# Patient Record
Sex: MALE | Race: WHITE | NOT HISPANIC OR LATINO
[De-identification: names, ages, dates, MRNs, and addresses within clinical notes are randomized per-mention and may not be internally consistent; named-entity substitution may affect disease eponyms.]

---

## 2017-05-23 PROBLEM — Z00.00 ENCOUNTER FOR PREVENTIVE HEALTH EXAMINATION: Status: ACTIVE | Noted: 2017-05-23

## 2017-06-07 ENCOUNTER — FORM ENCOUNTER (OUTPATIENT)
Age: 62
End: 2017-06-07

## 2017-06-08 ENCOUNTER — APPOINTMENT (OUTPATIENT)
Dept: ORTHOPEDIC SURGERY | Facility: CLINIC | Age: 62
End: 2017-06-08

## 2017-06-08 ENCOUNTER — OUTPATIENT (OUTPATIENT)
Dept: OUTPATIENT SERVICES | Facility: HOSPITAL | Age: 62
LOS: 1 days | End: 2017-06-08
Payer: OTHER GOVERNMENT

## 2017-06-08 VITALS — HEIGHT: 70 IN | WEIGHT: 175 LBS | BODY MASS INDEX: 25.05 KG/M2

## 2017-06-08 DIAGNOSIS — M17.11 UNILATERAL PRIMARY OSTEOARTHRITIS, RIGHT KNEE: ICD-10-CM

## 2017-06-08 PROCEDURE — 72170 X-RAY EXAM OF PELVIS: CPT

## 2017-06-08 PROCEDURE — 72170 X-RAY EXAM OF PELVIS: CPT | Mod: 26

## 2017-06-08 PROCEDURE — 73564 X-RAY EXAM KNEE 4 OR MORE: CPT | Mod: 26,RT

## 2017-06-08 PROCEDURE — 73564 X-RAY EXAM KNEE 4 OR MORE: CPT

## 2017-07-10 ENCOUNTER — OTHER (OUTPATIENT)
Age: 62
End: 2017-07-10

## 2017-07-11 ENCOUNTER — OTHER (OUTPATIENT)
Age: 62
End: 2017-07-11

## 2017-07-18 ENCOUNTER — FORM ENCOUNTER (OUTPATIENT)
Age: 62
End: 2017-07-18

## 2017-07-19 ENCOUNTER — OUTPATIENT (OUTPATIENT)
Dept: OUTPATIENT SERVICES | Facility: HOSPITAL | Age: 62
LOS: 1 days | End: 2017-07-19
Payer: OTHER GOVERNMENT

## 2017-07-19 DIAGNOSIS — Z22.321 CARRIER OR SUSPECTED CARRIER OF METHICILLIN SUSCEPTIBLE STAPHYLOCOCCUS AUREUS: ICD-10-CM

## 2017-07-19 DIAGNOSIS — M17.11 UNILATERAL PRIMARY OSTEOARTHRITIS, RIGHT KNEE: ICD-10-CM

## 2017-07-19 LAB
ANION GAP SERPL CALC-SCNC: 18 MMOL/L — HIGH (ref 5–17)
APPEARANCE UR: CLEAR — SIGNIFICANT CHANGE UP
APTT BLD: 27.2 SEC — LOW (ref 27.5–37.4)
BACTERIA # UR AUTO: PRESENT /HPF
BILIRUB UR-MCNC: NEGATIVE — SIGNIFICANT CHANGE UP
BUN SERPL-MCNC: 11 MG/DL — SIGNIFICANT CHANGE UP (ref 7–23)
CALCIUM SERPL-MCNC: 9.2 MG/DL — SIGNIFICANT CHANGE UP (ref 8.4–10.5)
CHLORIDE SERPL-SCNC: 94 MMOL/L — LOW (ref 96–108)
CO2 SERPL-SCNC: 23 MMOL/L — SIGNIFICANT CHANGE UP (ref 22–31)
COLOR SPEC: YELLOW — SIGNIFICANT CHANGE UP
CREAT SERPL-MCNC: 0.7 MG/DL — SIGNIFICANT CHANGE UP (ref 0.5–1.3)
DIFF PNL FLD: (no result)
EPI CELLS # UR: SIGNIFICANT CHANGE UP /HPF
GLUCOSE SERPL-MCNC: 82 MG/DL — SIGNIFICANT CHANGE UP (ref 70–99)
GLUCOSE UR QL: NEGATIVE — SIGNIFICANT CHANGE UP
HBA1C BLD-MCNC: 5 % — SIGNIFICANT CHANGE UP (ref 4–5.6)
HCT VFR BLD CALC: 43.4 % — SIGNIFICANT CHANGE UP (ref 39–50)
HGB BLD-MCNC: 14.7 G/DL — SIGNIFICANT CHANGE UP (ref 13–17)
INR BLD: 1.01 — SIGNIFICANT CHANGE UP (ref 0.88–1.16)
KETONES UR-MCNC: NEGATIVE — SIGNIFICANT CHANGE UP
LEUKOCYTE ESTERASE UR-ACNC: NEGATIVE — SIGNIFICANT CHANGE UP
MCHC RBC-ENTMCNC: 32.5 PG — SIGNIFICANT CHANGE UP (ref 27–34)
MCHC RBC-ENTMCNC: 33.9 G/DL — SIGNIFICANT CHANGE UP (ref 32–36)
MCV RBC AUTO: 96 FL — SIGNIFICANT CHANGE UP (ref 80–100)
NITRITE UR-MCNC: NEGATIVE — SIGNIFICANT CHANGE UP
PH UR: 5.5 — SIGNIFICANT CHANGE UP (ref 5–8)
PLATELET # BLD AUTO: 131 K/UL — LOW (ref 150–400)
POTASSIUM SERPL-MCNC: 3.5 MMOL/L — SIGNIFICANT CHANGE UP (ref 3.5–5.3)
POTASSIUM SERPL-SCNC: 3.5 MMOL/L — SIGNIFICANT CHANGE UP (ref 3.5–5.3)
PROT UR-MCNC: NEGATIVE MG/DL — SIGNIFICANT CHANGE UP
PROTHROM AB SERPL-ACNC: 11.2 SEC — SIGNIFICANT CHANGE UP (ref 9.8–12.7)
RBC # BLD: 4.52 M/UL — SIGNIFICANT CHANGE UP (ref 4.2–5.8)
RBC # FLD: 12.5 % — SIGNIFICANT CHANGE UP (ref 10.3–16.9)
RBC CASTS # UR COMP ASSIST: < 5 /HPF — SIGNIFICANT CHANGE UP
SODIUM SERPL-SCNC: 135 MMOL/L — SIGNIFICANT CHANGE UP (ref 135–145)
SP GR SPEC: 1.02 — SIGNIFICANT CHANGE UP (ref 1–1.03)
UROBILINOGEN FLD QL: 0.2 E.U./DL — SIGNIFICANT CHANGE UP
WBC # BLD: 5.2 K/UL — SIGNIFICANT CHANGE UP (ref 3.8–10.5)
WBC # FLD AUTO: 5.2 K/UL — SIGNIFICANT CHANGE UP (ref 3.8–10.5)
WBC UR QL: < 5 /HPF — SIGNIFICANT CHANGE UP

## 2017-07-19 PROCEDURE — 71020: CPT | Mod: 26

## 2017-07-19 PROCEDURE — 85027 COMPLETE CBC AUTOMATED: CPT

## 2017-07-19 PROCEDURE — 85610 PROTHROMBIN TIME: CPT

## 2017-07-19 PROCEDURE — 71046 X-RAY EXAM CHEST 2 VIEWS: CPT

## 2017-07-19 PROCEDURE — 93010 ELECTROCARDIOGRAM REPORT: CPT

## 2017-07-19 PROCEDURE — 87086 URINE CULTURE/COLONY COUNT: CPT

## 2017-07-19 PROCEDURE — 93005 ELECTROCARDIOGRAM TRACING: CPT

## 2017-07-19 PROCEDURE — 80048 BASIC METABOLIC PNL TOTAL CA: CPT

## 2017-07-19 PROCEDURE — 81001 URINALYSIS AUTO W/SCOPE: CPT

## 2017-07-19 PROCEDURE — 87641 MR-STAPH DNA AMP PROBE: CPT

## 2017-07-19 PROCEDURE — 83036 HEMOGLOBIN GLYCOSYLATED A1C: CPT

## 2017-07-19 PROCEDURE — 85730 THROMBOPLASTIN TIME PARTIAL: CPT

## 2017-07-21 LAB
CULTURE RESULTS: NO GROWTH — SIGNIFICANT CHANGE UP
SPECIMEN SOURCE: SIGNIFICANT CHANGE UP

## 2017-07-23 LAB
MRSA PCR RESULT.: NEGATIVE — SIGNIFICANT CHANGE UP
S AUREUS DNA NOSE QL NAA+PROBE: NEGATIVE — SIGNIFICANT CHANGE UP

## 2017-07-27 ENCOUNTER — APPOINTMENT (OUTPATIENT)
Dept: INTERNAL MEDICINE | Facility: CLINIC | Age: 62
End: 2017-07-27
Payer: OTHER GOVERNMENT

## 2017-07-27 VITALS
DIASTOLIC BLOOD PRESSURE: 70 MMHG | TEMPERATURE: 98.5 F | HEART RATE: 96 BPM | BODY MASS INDEX: 25.05 KG/M2 | WEIGHT: 175 LBS | SYSTOLIC BLOOD PRESSURE: 130 MMHG | OXYGEN SATURATION: 98 % | HEIGHT: 70 IN

## 2017-07-27 DIAGNOSIS — D69.6 THROMBOCYTOPENIA, UNSPECIFIED: ICD-10-CM

## 2017-07-27 PROCEDURE — 99204 OFFICE O/P NEW MOD 45 MIN: CPT

## 2017-07-31 ENCOUNTER — MEDICATION RENEWAL (OUTPATIENT)
Age: 62
End: 2017-07-31

## 2017-08-04 VITALS
SYSTOLIC BLOOD PRESSURE: 144 MMHG | DIASTOLIC BLOOD PRESSURE: 102 MMHG | RESPIRATION RATE: 20 BRPM | WEIGHT: 182.98 LBS | HEART RATE: 114 BPM | HEIGHT: 69 IN

## 2017-08-04 RX ORDER — OXYCODONE HYDROCHLORIDE 5 MG/1
20 TABLET ORAL ONCE
Qty: 0 | Refills: 0 | Status: DISCONTINUED | OUTPATIENT
Start: 2017-08-07 | End: 2017-08-07

## 2017-08-04 RX ORDER — CELECOXIB 200 MG/1
200 CAPSULE ORAL ONCE
Qty: 0 | Refills: 0 | Status: COMPLETED | OUTPATIENT
Start: 2017-08-07 | End: 2017-08-07

## 2017-08-04 NOTE — H&P ADULT - PROBLEM SELECTOR PLAN 1
Admit to Orthopaedic Service.  Presents today for elective right total hip replacement, anterior approach  Pt medically stable and cleared for procedure today by Dr. Rosales Zavala MD

## 2017-08-04 NOTE — H&P ADULT - NSHPPHYSICALEXAM_GEN_ALL_CORE
MSK:  +decreased ROM secondary to pain, right hip    Remainder of exam per medical clearance note MSK:  +decreased ROM secondary to pain, right hip  ehl, tib ant, GS 5/5 b/l  gross sensation intact b/ LE  DP palpable b/l  thigh/calf soft, NT    Remainder of exam per medical clearance note

## 2017-08-04 NOTE — H&P ADULT - NSHPLABSRESULTS_GEN_ALL_CORE
Preop CBC, BMP, PT/PTT/INR, UA/UCX - WNL per medical clearance   Preop EKG - normal sinus rhythm - reviewed by medical clearance   Preop CXR - no focal infiltrates - reviewed by medical clearance

## 2017-08-04 NOTE — H&P ADULT - HISTORY OF PRESENT ILLNESS
62M c/o right hip pain x    Presents today for elective right total hip replacement, anterior approach 62M c/o right hip pain x chronic    Presents today for elective right total hip replacement, anterior approach.  Independent ambulator. No numbness of LE. No complaints.

## 2017-08-07 ENCOUNTER — INPATIENT (INPATIENT)
Facility: HOSPITAL | Age: 62
LOS: 0 days | Discharge: HOME | DRG: 470 | End: 2017-08-08
Attending: ORTHOPAEDIC SURGERY | Admitting: ORTHOPAEDIC SURGERY
Payer: OTHER GOVERNMENT

## 2017-08-07 ENCOUNTER — APPOINTMENT (OUTPATIENT)
Dept: ORTHOPEDIC SURGERY | Facility: HOSPITAL | Age: 62
End: 2017-08-07

## 2017-08-07 ENCOUNTER — RESULT REVIEW (OUTPATIENT)
Age: 62
End: 2017-08-07

## 2017-08-07 DIAGNOSIS — M16.11 UNILATERAL PRIMARY OSTEOARTHRITIS, RIGHT HIP: ICD-10-CM

## 2017-08-07 PROCEDURE — 27130 TOTAL HIP ARTHROPLASTY: CPT | Mod: RT

## 2017-08-07 PROCEDURE — 72170 X-RAY EXAM OF PELVIS: CPT | Mod: 26

## 2017-08-07 RX ORDER — MORPHINE SULFATE 50 MG/1
2 CAPSULE, EXTENDED RELEASE ORAL
Qty: 0 | Refills: 0 | Status: DISCONTINUED | OUTPATIENT
Start: 2017-08-07 | End: 2017-08-08

## 2017-08-07 RX ORDER — MAGNESIUM HYDROXIDE 400 MG/1
30 TABLET, CHEWABLE ORAL DAILY
Qty: 0 | Refills: 0 | Status: DISCONTINUED | OUTPATIENT
Start: 2017-08-07 | End: 2017-08-08

## 2017-08-07 RX ORDER — ACETAMINOPHEN 500 MG
650 TABLET ORAL EVERY 6 HOURS
Qty: 0 | Refills: 0 | Status: DISCONTINUED | OUTPATIENT
Start: 2017-08-07 | End: 2017-08-08

## 2017-08-07 RX ORDER — ASPIRIN/CALCIUM CARB/MAGNESIUM 324 MG
325 TABLET ORAL
Qty: 0 | Refills: 0 | Status: DISCONTINUED | OUTPATIENT
Start: 2017-08-07 | End: 2017-08-08

## 2017-08-07 RX ORDER — LABETALOL HCL 100 MG
200 TABLET ORAL ONCE
Qty: 0 | Refills: 0 | Status: COMPLETED | OUTPATIENT
Start: 2017-08-07 | End: 2017-08-07

## 2017-08-07 RX ORDER — BUPIVACAINE 13.3 MG/ML
20 INJECTION, SUSPENSION, LIPOSOMAL INFILTRATION ONCE
Qty: 0 | Refills: 0 | Status: DISCONTINUED | OUTPATIENT
Start: 2017-08-07 | End: 2017-08-08

## 2017-08-07 RX ORDER — ONDANSETRON 8 MG/1
4 TABLET, FILM COATED ORAL EVERY 6 HOURS
Qty: 0 | Refills: 0 | Status: DISCONTINUED | OUTPATIENT
Start: 2017-08-07 | End: 2017-08-08

## 2017-08-07 RX ORDER — CEFAZOLIN SODIUM 1 G
2000 VIAL (EA) INJECTION EVERY 8 HOURS
Qty: 0 | Refills: 0 | Status: COMPLETED | OUTPATIENT
Start: 2017-08-07 | End: 2017-08-08

## 2017-08-07 RX ORDER — CELECOXIB 200 MG/1
200 CAPSULE ORAL
Qty: 0 | Refills: 0 | Status: DISCONTINUED | OUTPATIENT
Start: 2017-08-07 | End: 2017-08-08

## 2017-08-07 RX ORDER — POLYETHYLENE GLYCOL 3350 17 G/17G
17 POWDER, FOR SOLUTION ORAL DAILY
Qty: 0 | Refills: 0 | Status: DISCONTINUED | OUTPATIENT
Start: 2017-08-07 | End: 2017-08-08

## 2017-08-07 RX ORDER — SENNA PLUS 8.6 MG/1
2 TABLET ORAL AT BEDTIME
Qty: 0 | Refills: 0 | Status: DISCONTINUED | OUTPATIENT
Start: 2017-08-07 | End: 2017-08-08

## 2017-08-07 RX ORDER — PANTOPRAZOLE SODIUM 20 MG/1
40 TABLET, DELAYED RELEASE ORAL DAILY
Qty: 0 | Refills: 0 | Status: DISCONTINUED | OUTPATIENT
Start: 2017-08-07 | End: 2017-08-08

## 2017-08-07 RX ORDER — OXYCODONE HYDROCHLORIDE 5 MG/1
10 TABLET ORAL EVERY 4 HOURS
Qty: 0 | Refills: 0 | Status: DISCONTINUED | OUTPATIENT
Start: 2017-08-07 | End: 2017-08-08

## 2017-08-07 RX ORDER — DOCUSATE SODIUM 100 MG
100 CAPSULE ORAL THREE TIMES A DAY
Qty: 0 | Refills: 0 | Status: DISCONTINUED | OUTPATIENT
Start: 2017-08-07 | End: 2017-08-08

## 2017-08-07 RX ORDER — OXYCODONE HYDROCHLORIDE 5 MG/1
5 TABLET ORAL EVERY 4 HOURS
Qty: 0 | Refills: 0 | Status: DISCONTINUED | OUTPATIENT
Start: 2017-08-07 | End: 2017-08-08

## 2017-08-07 RX ORDER — SODIUM CHLORIDE 9 MG/ML
1000 INJECTION, SOLUTION INTRAVENOUS
Qty: 0 | Refills: 0 | Status: DISCONTINUED | OUTPATIENT
Start: 2017-08-07 | End: 2017-08-08

## 2017-08-07 RX ORDER — KETOROLAC TROMETHAMINE 30 MG/ML
15 SYRINGE (ML) INJECTION ONCE
Qty: 0 | Refills: 0 | Status: DISCONTINUED | OUTPATIENT
Start: 2017-08-07 | End: 2017-08-08

## 2017-08-07 RX ADMIN — Medication 325 MILLIGRAM(S): at 21:44

## 2017-08-07 RX ADMIN — CELECOXIB 200 MILLIGRAM(S): 200 CAPSULE ORAL at 08:06

## 2017-08-07 RX ADMIN — Medication 200 MILLIGRAM(S): at 21:45

## 2017-08-07 RX ADMIN — OXYCODONE HYDROCHLORIDE 20 MILLIGRAM(S): 5 TABLET ORAL at 08:07

## 2017-08-07 RX ADMIN — Medication 30 MILLILITER(S): at 20:11

## 2017-08-07 RX ADMIN — Medication 100 MILLIGRAM(S): at 21:45

## 2017-08-07 RX ADMIN — Medication 100 MILLIGRAM(S): at 17:53

## 2017-08-07 NOTE — PHYSICAL THERAPY INITIAL EVALUATION ADULT - MANUAL MUSCLE TESTING RESULTS, REHAB EVAL
B UE and B LE >3+/5 upon functional assessment against gravity except right hip limited due to pain. Right hip strength functional for transfers and ambulation.

## 2017-08-07 NOTE — DISCHARGE NOTE ADULT - CARE PROVIDER_API CALL
Zack Donahue), Orthopaedic Surgery  [address]  [address] [place] [address]  Phone: [phone]  Fax: [phone]

## 2017-08-07 NOTE — PHYSICAL THERAPY INITIAL EVALUATION ADULT - ADDITIONAL COMMENTS
Patient states that he is an  and plans to return to work once recovered from this surgery. States that he lives in an elevator building and will have assistance x 1 week upon discharge.

## 2017-08-07 NOTE — DISCHARGE NOTE ADULT - MEDICATION SUMMARY - MEDICATIONS TO TAKE
I will START or STAY ON the medications listed below when I get home from the hospital:    CeleBREX 200 mg oral capsule  -- 1 cap(s) by mouth 2 times a day (after meals)  -- Indication: For Pain    Percocet 5/325 oral tablet  -- 1 - 2 tab(s) by mouth every 4 - 6 hours  -- Indication: For Pain    aspirin 325 mg oral delayed release tablet  -- 1 tab(s) by mouth 2 times a day  -- Indication: For dvt ppx    docusate sodium 100 mg oral capsule  -- 1 cap(s) by mouth 3 times a day  -- Indication: For constipation    bisacodyl 10 mg rectal suppository  -- 1 suppository(ies) rectally once a day, As needed, If no bowel movement by POD#2  -- Indication: For constipation    polyethylene glycol 3350 oral powder for reconstitution  -- 17 gram(s) by mouth once a day  -- Indication: For constipation    senna oral tablet  -- 2 tab(s) by mouth once a day (at bedtime)  -- Indication: For constipation    pantoprazole 40 mg oral delayed release tablet  -- 1 tab(s) by mouth once a day  -- Indication: For GERD

## 2017-08-07 NOTE — PHYSICAL THERAPY INITIAL EVALUATION ADULT - CRITERIA FOR SKILLED THERAPEUTIC INTERVENTIONS
rehab potential/impairments found/anticipated discharge recommendation/therapy frequency/anticipated equipment needs at discharge

## 2017-08-07 NOTE — PROGRESS NOTE ADULT - SUBJECTIVE AND OBJECTIVE BOX
Ortho Post Op Check    Procedure: right total hip replacement  Surgeon: Dr. Donahue    Pt resting comfortably without complaints, pain well controlled.  Denies CP, SOB, N/V, numbness/tingling of extremities.    Vital Signs Last 24 Hrs  T(C): 36.1 (08-07-17 @ 14:30), Max: 36.3 (08-07-17 @ 11:35)  T(F): 97 (08-07-17 @ 14:30), Max: 97.4 (08-07-17 @ 11:35)  HR: 92 (08-07-17 @ 14:30) (83 - 99)  BP: 138/97 (08-07-17 @ 14:30) (108/74 - 139/81)  BP(mean): --  RR: 12 (08-07-17 @ 14:30) (10 - 16)  SpO2: 99% (08-07-17 @ 14:30) (95% - 99%)  AVSS    General: Pt Alert and oriented, NAD  DSG C/D/I  Pulses:  Sensation:  Motor: EHL/FHL/TA/GS            Post-op X-Ray: s/p right THR, prosthesis intact    A/P: 62yMale POD#0 s/p right total hip replacement  - Stable  - Pain Control  - DVT ppx: ASA  - Post op abx: Ancef  - PT, WBS: WBAT  - F/u AM labs    Ortho Pager [phone] Ortho Post Op Check    Procedure: right total hip replacement  Surgeon: Dr. Donahue    Pt resting , pain.  CP, SOB, N/V, numbness/tingling of extremities.    Vital Signs Last 24 Hrs  T(C): 36.1 (08-07-17 @ 14:30), Max: 36.3 (08-07-17 @ 11:35)  T(F): 97 (08-07-17 @ 14:30), Max: 97.4 (08-07-17 @ 11:35)  HR: 92 (08-07-17 @ 14:30) (83 - 99)  BP: 138/97 (08-07-17 @ 14:30) (108/74 - 139/81)  BP(mean): --  RR: 12 (08-07-17 @ 14:30) (10 - 16)  SpO2: 99% (08-07-17 @ 14:30) (95% - 99%)  AVSS    General: Pt Alert and oriented, NAD  DSG C/D/I  Pulses:  Sensation:  Motor: EHL/FHL/TA/GS        Post-op X-Ray: s/p right THR, prosthesis intact    A/P: 62yMale POD#0 s/p right total hip replacement  - Stable  - Pain Control  - DVT ppx: ASA  - Post op abx: Ancef  - PT, WBS: WBAT  - F/u AM labs    Ortho Pager [phone] Ortho Post Op Check    Procedure: right total hip replacement  Surgeon: Dr. Donahue    Pt resting , pain well controlled in bed. Worked with PT this afternoon.   Denies CP, SOB, N/V, numbness/tingling of extremities.    Vital Signs Last 24 Hrs  T(C): 36.1 (08-07-17 @ 14:30), Max: 36.3 (08-07-17 @ 11:35)  T(F): 97 (08-07-17 @ 14:30), Max: 97.4 (08-07-17 @ 11:35)  HR: 92 (08-07-17 @ 14:30) (83 - 99)  BP: 138/97 (08-07-17 @ 14:30) (108/74 - 139/81)  BP(mean): --  RR: 12 (08-07-17 @ 14:30) (10 - 16)  SpO2: 99% (08-07-17 @ 14:30) (95% - 99%)  AVSS    General: Pt Alert and oriented, NAD  DSG C/D/I  Pulses dp palpable, skin warm and well perfused.  Sensation intact and equal BLE  Motor: EHL/FHL/TA/GS 5/5 BLE       Post-op X-Ray: s/p right THR, prosthesis intact    A/P: 62yMale POD#0 s/p right total hip replacement doing well  - Stable  - Pain Control  - DVT ppx: ASA  - Post op abx: Ancef  - PT, WBS: WBAT  - F/u AM labs    Ortho Pager [phone]

## 2017-08-07 NOTE — PHYSICAL THERAPY INITIAL EVALUATION ADULT - IMPAIRMENTS FOUND, PT EVAL
gait, locomotion, and balance/joint integrity and mobility/aerobic capacity/endurance/muscle strength/ROM

## 2017-08-07 NOTE — DISCHARGE NOTE ADULT - PLAN OF CARE
improvement after surgery No strenuous activity, heavy lifting, driving, tub bathing, or returning to work until cleared by MD.  You may shower--dressing is waterproof.  Remove dressing after post op day 5, then leave incision open to air.  Follow up with Dr. Donahue, call office to schedule an appt within 10-14 days.  If you don't have a bowel movement by post op day 3, then take Milk of Magnesia (over the counter).  If no bowel movement by at least post op day 5, then use a Dulcolax suppository (over the counter) and/or a Fleets enema--if still no bowel movement, call your MD.  Contact your doctor if you experience: fever greater than 101.5, chills, chest pain, difficulty breathing, bleeding, redness or heat around the incision.

## 2017-08-07 NOTE — DISCHARGE NOTE ADULT - PATIENT PORTAL LINK FT
“You can access the [employer] Patient Portal, offered by [facility], by registering with the following website: [email or web address]”

## 2017-08-07 NOTE — DISCHARGE NOTE ADULT - CARE PLAN
Principal Discharge DX:	Primary osteoarthritis of right hip  Goal:	improvement after surgery  Instructions for follow-up, activity and diet:	No strenuous activity, heavy lifting, driving, tub bathing, or returning to work until cleared by MD.  You may shower--dressing is waterproof.  Remove dressing after post op day 5, then leave incision open to air.  Follow up with Dr. Donahue, call office to schedule an appt within 10-14 days.  If you don't have a bowel movement by post op day 3, then take Milk of Magnesia (over the counter).  If no bowel movement by at least post op day 5, then use a Dulcolax suppository (over the counter) and/or a Fleets enema--if still no bowel movement, call your MD.  Contact your doctor if you experience: fever greater than 101.5, chills, chest pain, difficulty breathing, bleeding, redness or heat around the incision.

## 2017-08-08 VITALS
HEART RATE: 96 BPM | RESPIRATION RATE: 16 BRPM | DIASTOLIC BLOOD PRESSURE: 89 MMHG | OXYGEN SATURATION: 97 % | TEMPERATURE: 97 F | SYSTOLIC BLOOD PRESSURE: 141 MMHG

## 2017-08-08 LAB
ANION GAP SERPL CALC-SCNC: 13 MMOL/L — SIGNIFICANT CHANGE UP (ref 5–17)
BUN SERPL-MCNC: 10 MG/DL — SIGNIFICANT CHANGE UP (ref 7–23)
CALCIUM SERPL-MCNC: 8.5 MG/DL — SIGNIFICANT CHANGE UP (ref 8.4–10.5)
CHLORIDE SERPL-SCNC: 97 MMOL/L — SIGNIFICANT CHANGE UP (ref 96–108)
CO2 SERPL-SCNC: 25 MMOL/L — SIGNIFICANT CHANGE UP (ref 22–31)
CREAT SERPL-MCNC: 0.7 MG/DL — SIGNIFICANT CHANGE UP (ref 0.5–1.3)
GLUCOSE SERPL-MCNC: 197 MG/DL — HIGH (ref 70–99)
HCT VFR BLD CALC: 31.1 % — LOW (ref 39–50)
HGB BLD-MCNC: 10.8 G/DL — LOW (ref 13–17)
MCHC RBC-ENTMCNC: 33 PG — SIGNIFICANT CHANGE UP (ref 27–34)
MCHC RBC-ENTMCNC: 34.7 G/DL — SIGNIFICANT CHANGE UP (ref 32–36)
MCV RBC AUTO: 95.1 FL — SIGNIFICANT CHANGE UP (ref 80–100)
PLATELET # BLD AUTO: 157 K/UL — SIGNIFICANT CHANGE UP (ref 150–400)
POTASSIUM SERPL-MCNC: 4.6 MMOL/L — SIGNIFICANT CHANGE UP (ref 3.5–5.3)
POTASSIUM SERPL-SCNC: 4.6 MMOL/L — SIGNIFICANT CHANGE UP (ref 3.5–5.3)
RBC # BLD: 3.27 M/UL — LOW (ref 4.2–5.8)
RBC # FLD: 13.1 % — SIGNIFICANT CHANGE UP (ref 10.3–16.9)
SODIUM SERPL-SCNC: 135 MMOL/L — SIGNIFICANT CHANGE UP (ref 135–145)
WBC # BLD: 9.4 K/UL — SIGNIFICANT CHANGE UP (ref 3.8–10.5)
WBC # FLD AUTO: 9.4 K/UL — SIGNIFICANT CHANGE UP (ref 3.8–10.5)

## 2017-08-08 PROCEDURE — 99253 IP/OBS CNSLTJ NEW/EST LOW 45: CPT

## 2017-08-08 RX ORDER — SENNA PLUS 8.6 MG/1
2 TABLET ORAL
Qty: 0 | Refills: 0 | COMMUNITY
Start: 2017-08-08

## 2017-08-08 RX ORDER — CELECOXIB 200 MG/1
1 CAPSULE ORAL
Qty: 0 | Refills: 0 | COMMUNITY
Start: 2017-08-08

## 2017-08-08 RX ORDER — DOCUSATE SODIUM 100 MG
1 CAPSULE ORAL
Qty: 0 | Refills: 0 | COMMUNITY
Start: 2017-08-08

## 2017-08-08 RX ORDER — PANTOPRAZOLE SODIUM 20 MG/1
1 TABLET, DELAYED RELEASE ORAL
Qty: 0 | Refills: 0 | COMMUNITY
Start: 2017-08-08

## 2017-08-08 RX ORDER — POLYETHYLENE GLYCOL 3350 17 G/17G
17 POWDER, FOR SOLUTION ORAL
Qty: 0 | Refills: 0 | COMMUNITY
Start: 2017-08-08

## 2017-08-08 RX ORDER — ASPIRIN/CALCIUM CARB/MAGNESIUM 324 MG
1 TABLET ORAL
Qty: 0 | Refills: 0 | COMMUNITY
Start: 2017-08-08

## 2017-08-08 RX ADMIN — Medication 325 MILLIGRAM(S): at 05:11

## 2017-08-08 RX ADMIN — OXYCODONE HYDROCHLORIDE 5 MILLIGRAM(S): 5 TABLET ORAL at 06:25

## 2017-08-08 RX ADMIN — POLYETHYLENE GLYCOL 3350 17 GRAM(S): 17 POWDER, FOR SOLUTION ORAL at 08:29

## 2017-08-08 RX ADMIN — Medication 30 MILLILITER(S): at 10:14

## 2017-08-08 RX ADMIN — CELECOXIB 200 MILLIGRAM(S): 200 CAPSULE ORAL at 09:20

## 2017-08-08 RX ADMIN — PANTOPRAZOLE SODIUM 40 MILLIGRAM(S): 20 TABLET, DELAYED RELEASE ORAL at 08:29

## 2017-08-08 RX ADMIN — CELECOXIB 200 MILLIGRAM(S): 200 CAPSULE ORAL at 08:29

## 2017-08-08 RX ADMIN — Medication 100 MILLIGRAM(S): at 02:42

## 2017-08-08 RX ADMIN — Medication 100 MILLIGRAM(S): at 05:11

## 2017-08-08 RX ADMIN — Medication 100 MILLIGRAM(S): at 13:01

## 2017-08-08 RX ADMIN — OXYCODONE HYDROCHLORIDE 5 MILLIGRAM(S): 5 TABLET ORAL at 07:20

## 2017-08-08 NOTE — PROGRESS NOTE ADULT - SUBJECTIVE AND OBJECTIVE BOX
Patient seen and examined at bedside.  No acute events overnight.  Denies SOB, Chest pain, Nausea, vomiting.  C/O mild heartburn that improved.  Pain minimal and well controlled        O: T(C): 36.7 (08-08-17 @ 05:12), Max: 36.8 (08-07-17 @ 15:57)  HR: 84 (08-08-17 @ 05:12) (83 - 118)  BP: 137/83 (08-08-17 @ 05:12) (108/74 - 166/99)  RR: 17 (08-08-17 @ 05:12) (10 - 17)  SpO2: 96% (08-08-17 @ 05:12) (93% - 99%)  Wt(kg): --    NAD, AOx3, non-labored respirations    Dressing CDI  Extremity soft, appropriate swelling and minimally TTP  minimal ecchymosis over medial aspect of thigh, dressing clean, dry and intact   foot warm and well perfused  SILT,   2+ DP,   5/5 TA/EHL/GS        I&O's Detail    07 Aug 2017 07:01  -  08 Aug 2017 06:23  --------------------------------------------------------  IN:    lactated ringers.: 800 mL    Oral Fluid: 100 mL  Total IN: 900 mL    OUT:    Voided: 500 mL  Total OUT: 500 mL    Total NET: 400 mL    A/P 63yo M POD#1 s/p Right anterior total hip arthroplasty  - continue pain control-PO meds, iv breakthrough  - Aspirin 325BID,  foot pumps  -WBAT, PT/OT   - regular diet  -dispo planning for home               A/P: 62y Male s/p R YUSUF    - analgesia with bowel regimen  - WBAT with PT  - OOB ad veda  - DVT ppx: aspirin enteric coated 325 milliGRAM(s) Oral two times a day  - ADAT  - f/u labs  - Dispo planning

## 2017-08-08 NOTE — PROGRESS NOTE ADULT - SUBJECTIVE AND OBJECTIVE BOX
POST OPERATIVE DAY #: 1  STATUS POST: Right THR                        SUBJECTIVE: Patient seen and examined.     Pain:  well controlled      OBJECTIVE:     Vital Signs Last 24 Hrs  T(C): 37.1 (08 Aug 2017 08:17), Max: 37.1 (08 Aug 2017 08:17)  T(F): 98.8 (08 Aug 2017 08:17), Max: 98.8 (08 Aug 2017 08:17)  HR: 84 (08 Aug 2017 08:17) (83 - 118)  BP: 136/71 (08 Aug 2017 08:17) (108/74 - 166/99)  BP(mean): --  RR: 16 (08 Aug 2017 08:17) (10 - 17)  SpO2: 96% (08 Aug 2017 08:17) (93% - 99%)    Affected extremity:          Dressing: clean/dry/intact          Sensation: intact to light touch to patient's baseline         Motor exam:  firing EHL          warm, well-perfused; capillary refill < 3 seconds              I&O's Detail    07 Aug 2017 07:01  -  08 Aug 2017 07:00  --------------------------------------------------------  IN:    lactated ringers.: 800 mL    Oral Fluid: 100 mL  Total IN: 900 mL    OUT:    Voided: 1100 mL  Total OUT: 1100 mL    Total NET: -200 mL          LABS:                        10.8   9.4   )-----------( 157      ( 08 Aug 2017 06:13 )             31.1     08-08    135  |  97  |  10  ----------------------------<  197<H>  4.6   |  25  |  0.70    Ca    8.5      08 Aug 2017 06:12            MEDICATIONS:    acetaminophen   Tablet 650 milliGRAM(s) Oral every 6 hours PRN  celecoxib 200 milliGRAM(s) Oral two times a day after meals  ketorolac   Injectable 15 milliGRAM(s) IV Push once PRN  oxyCODONE    IR 5 milliGRAM(s) Oral every 4 hours PRN  oxyCODONE    IR 10 milliGRAM(s) Oral every 4 hours PRN  ondansetron Injectable 4 milliGRAM(s) IV Push every 6 hours PRN  morphine  - Injectable 2 milliGRAM(s) IV Push every 15 minutes PRN    aspirin enteric coated 325 milliGRAM(s) Oral two times a day      RADIOLOGY & ADDITIONAL STUDIES:    ASSESSMENT AND PLAN:     1. Patient was hypertensive and tachycardic overnight, improved with labetalol. Patient is a  and has long standing history of tachycardia and follows with a cardiologist outpatient. Currently stable.  2. Analgesic pain control  3. DVT prophylaxis: ASA      4. Weight Bearing Status:  Weight bearing as tolerated   5. Disposition: Home today if cleared by PT.

## 2017-08-08 NOTE — CONSULT NOTE ADULT - ASSESSMENT
63 yo m s/p right elective THR.   1-Elevated blood pressure and tachycardia overnight - s/p labetalol and improved this AM. Patient as a hx of borderline tachycardia for years.  Gets a physical q 6 months as he is a .  Plan to monitor for now  2- Pain management  3- DVT ppx with asa bid and scd's  4- OOB with PT this am

## 2017-08-08 NOTE — CONSULT NOTE ADULT - SUBJECTIVE AND OBJECTIVE BOX
Patient seen and examined, Chart reviewed    HPI:  62M c/o right hip pain - chronic, now s/p elective right total hip replacement, anterior approach.  Independent ambulator. No numbness of LE. Had heartburn overnight, now resolved.        PAST MEDICAL & SURGICAL HISTORY:  No pertinent past medical history  No significant past surgical history      REVIEW OF SYSTEMS:  CONSTITUTIONAL:  No night sweats.  No fatigue,  No fever or chills.  HEENT:  Eyes:  No visual changes.    RESPIRATORY:  No cough.  No wheeze.    No shortness of breath.  CARDIOVASCULAR:  No chest pains.  No palpitations.  GASTROINTESTINAL: as above  No abdominal pain.  No nausea or vomiting.  No diarrhea or constipation.    GENITOURINARY:  No urgency.  No frequency.  No dysuria.  No hematuria.    MUSCULOSKELETAL:  hip pain present    SKIN:  No rashes.  .      lactated ringers. 1000 milliLiter(s) IV Continuous <Continuous>  aspirin enteric coated 325 milliGRAM(s) Oral two times a day  acetaminophen   Tablet 650 milliGRAM(s) Oral every 6 hours PRN  celecoxib 200 milliGRAM(s) Oral two times a day after meals  ketorolac   Injectable 15 milliGRAM(s) IV Push once PRN  oxyCODONE    IR 5 milliGRAM(s) Oral every 4 hours PRN  oxyCODONE    IR 10 milliGRAM(s) Oral every 4 hours PRN  aluminum hydroxide/magnesium hydroxide/simethicone Suspension 30 milliLiter(s) Oral four times a day PRN  ondansetron Injectable 4 milliGRAM(s) IV Push every 6 hours PRN  pantoprazole    Tablet 40 milliGRAM(s) Oral daily  polyethylene glycol 3350 17 Gram(s) Oral daily  senna 2 Tablet(s) Oral at bedtime  magnesium hydroxide Suspension 30 milliLiter(s) Oral daily PRN  docusate sodium 100 milliGRAM(s) Oral three times a day  morphine  - Injectable 2 milliGRAM(s) IV Push every 15 minutes PRN  BUpivacaine liposome 1.3% Injectable (no eMAR) 20 milliLiter(s) Local Injection once      Allergies    No Known Allergies    SOCIAL HISTORY: no tob    FAMILY HISTORY: nc      Vital Signs Last 24 Hrs  T(C): 36.7 (08 Aug 2017 05:12), Max: 36.8 (07 Aug 2017 15:57)  T(F): 98 (08 Aug 2017 05:12), Max: 98.2 (07 Aug 2017 15:57)  HR: 84 (08 Aug 2017 05:12) (83 - 118)  BP: 137/83 (08 Aug 2017 05:12) (108/74 - 166/99)  BP(mean): --  RR: 17 (08 Aug 2017 05:12) (10 - 17)  SpO2: 96% (08 Aug 2017 05:12) (93% - 99%)    08-07 @ 07:01  -  08-08 @ 07:00  --------------------------------------------------------  IN: 900 mL / OUT: 1100 mL / NET: -200 mL        PHYSICAL EXAM:   General - NAD, Alert and oriented x 3, t  Neck - - No lymphadenopathy  Cardiovascular - RRR no m/r/g, no JVD  Lungs - Clear to auscltation, no use of acessory muscles, no crackles or wheezes.  Skin - No rashes, skin warm and dry,   Abdomen - Normal bowel sounds, abdomen soft and nontender  Extremeties - No edema,       LABS:                        10.8   9.4   )-----------( 157      ( 08 Aug 2017 06:13 )             31.1     08-08    135  |  97  |  10  ----------------------------<  197<H>  4.6   |  25  |  0.70    Ca    8.5      08 Aug 2017 06:12            RADIOLOGY & ADDITIONAL STUDIES:

## 2017-08-09 LAB — SURGICAL PATHOLOGY STUDY: SIGNIFICANT CHANGE UP

## 2017-08-09 PROCEDURE — 80048 BASIC METABOLIC PNL TOTAL CA: CPT

## 2017-08-09 PROCEDURE — 86900 BLOOD TYPING SEROLOGIC ABO: CPT

## 2017-08-09 PROCEDURE — C1713: CPT

## 2017-08-09 PROCEDURE — 36415 COLL VENOUS BLD VENIPUNCTURE: CPT

## 2017-08-09 PROCEDURE — 72170 X-RAY EXAM OF PELVIS: CPT

## 2017-08-09 PROCEDURE — 85027 COMPLETE CBC AUTOMATED: CPT

## 2017-08-09 PROCEDURE — 86901 BLOOD TYPING SEROLOGIC RH(D): CPT

## 2017-08-09 PROCEDURE — 97116 GAIT TRAINING THERAPY: CPT

## 2017-08-09 PROCEDURE — 88300 SURGICAL PATH GROSS: CPT

## 2017-08-09 PROCEDURE — 97161 PT EVAL LOW COMPLEX 20 MIN: CPT

## 2017-08-09 PROCEDURE — 86850 RBC ANTIBODY SCREEN: CPT

## 2017-08-09 PROCEDURE — C1776: CPT

## 2017-08-09 PROCEDURE — 76000 FLUOROSCOPY <1 HR PHYS/QHP: CPT

## 2017-08-10 DIAGNOSIS — K59.00 CONSTIPATION, UNSPECIFIED: ICD-10-CM

## 2017-08-10 DIAGNOSIS — M16.11 UNILATERAL PRIMARY OSTEOARTHRITIS, RIGHT HIP: ICD-10-CM

## 2017-08-10 DIAGNOSIS — K21.9 GASTRO-ESOPHAGEAL REFLUX DISEASE WITHOUT ESOPHAGITIS: ICD-10-CM

## 2017-08-10 DIAGNOSIS — R00.0 TACHYCARDIA, UNSPECIFIED: ICD-10-CM

## 2017-08-10 DIAGNOSIS — R03.0 ELEVATED BLOOD-PRESSURE READING, WITHOUT DIAGNOSIS OF HYPERTENSION: ICD-10-CM

## 2017-08-14 ENCOUNTER — MEDICATION RENEWAL (OUTPATIENT)
Age: 62
End: 2017-08-14

## 2017-08-19 ENCOUNTER — INPATIENT (INPATIENT)
Facility: HOSPITAL | Age: 62
LOS: 9 days | Discharge: HOME | DRG: 330 | End: 2017-08-29
Attending: SURGERY | Admitting: SURGERY
Payer: OTHER GOVERNMENT

## 2017-08-19 VITALS
DIASTOLIC BLOOD PRESSURE: 92 MMHG | TEMPERATURE: 98 F | HEART RATE: 122 BPM | SYSTOLIC BLOOD PRESSURE: 131 MMHG | HEIGHT: 70 IN | OXYGEN SATURATION: 100 % | RESPIRATION RATE: 18 BRPM | WEIGHT: 179.9 LBS

## 2017-08-19 DIAGNOSIS — K65.1 PERITONEAL ABSCESS: ICD-10-CM

## 2017-08-19 DIAGNOSIS — Z96.641 PRESENCE OF RIGHT ARTIFICIAL HIP JOINT: Chronic | ICD-10-CM

## 2017-08-19 LAB
ALBUMIN SERPL ELPH-MCNC: 3.7 G/DL — SIGNIFICANT CHANGE UP (ref 3.3–5)
ALP SERPL-CCNC: 71 U/L — SIGNIFICANT CHANGE UP (ref 40–120)
ALT FLD-CCNC: 30 U/L — SIGNIFICANT CHANGE UP (ref 10–45)
APTT BLD: 26.1 SEC — LOW (ref 27.5–37.4)
AST SERPL-CCNC: 27 U/L — SIGNIFICANT CHANGE UP (ref 10–40)
BASOPHILS NFR BLD AUTO: 0.2 % — SIGNIFICANT CHANGE UP (ref 0–2)
BILIRUB SERPL-MCNC: 1.5 MG/DL — HIGH (ref 0.2–1.2)
BLD GP AB SCN SERPL QL: NEGATIVE — SIGNIFICANT CHANGE UP
BUN SERPL-MCNC: 6 MG/DL — LOW (ref 7–23)
CALCIUM SERPL-MCNC: 9.2 MG/DL — SIGNIFICANT CHANGE UP (ref 8.4–10.5)
CHLORIDE SERPL-SCNC: 86 MMOL/L — LOW (ref 96–108)
CO2 SERPL-SCNC: 26 MMOL/L — SIGNIFICANT CHANGE UP (ref 22–31)
CREAT SERPL-MCNC: 0.6 MG/DL — SIGNIFICANT CHANGE UP (ref 0.5–1.3)
EOSINOPHIL NFR BLD AUTO: 0.1 % — SIGNIFICANT CHANGE UP (ref 0–6)
GLUCOSE SERPL-MCNC: 109 MG/DL — HIGH (ref 70–99)
HCT VFR BLD CALC: 33.4 % — LOW (ref 39–50)
HGB BLD-MCNC: 11.6 G/DL — LOW (ref 13–17)
INR BLD: 1.22 — HIGH (ref 0.88–1.16)
LACTATE SERPL-SCNC: 1.9 MMOL/L — SIGNIFICANT CHANGE UP (ref 0.5–2)
LYMPHOCYTES # BLD AUTO: 5.5 % — LOW (ref 13–44)
MCHC RBC-ENTMCNC: 32.8 PG — SIGNIFICANT CHANGE UP (ref 27–34)
MCHC RBC-ENTMCNC: 34.7 G/DL — SIGNIFICANT CHANGE UP (ref 32–36)
MCV RBC AUTO: 94.4 FL — SIGNIFICANT CHANGE UP (ref 80–100)
MONOCYTES NFR BLD AUTO: 10.3 % — SIGNIFICANT CHANGE UP (ref 2–14)
NEUTROPHILS NFR BLD AUTO: 83.9 % — HIGH (ref 43–77)
PLATELET # BLD AUTO: 641 K/UL — HIGH (ref 150–400)
POTASSIUM SERPL-MCNC: 3.4 MMOL/L — LOW (ref 3.5–5.3)
POTASSIUM SERPL-SCNC: 3.4 MMOL/L — LOW (ref 3.5–5.3)
PROT SERPL-MCNC: 7.7 G/DL — SIGNIFICANT CHANGE UP (ref 6–8.3)
PROTHROM AB SERPL-ACNC: 13.6 SEC — HIGH (ref 9.8–12.7)
RBC # BLD: 3.54 M/UL — LOW (ref 4.2–5.8)
RBC # FLD: 13.7 % — SIGNIFICANT CHANGE UP (ref 10.3–16.9)
RH IG SCN BLD-IMP: POSITIVE — SIGNIFICANT CHANGE UP
SODIUM SERPL-SCNC: 131 MMOL/L — LOW (ref 135–145)
WBC # BLD: 15.7 K/UL — HIGH (ref 3.8–10.5)
WBC # FLD AUTO: 15.7 K/UL — HIGH (ref 3.8–10.5)

## 2017-08-19 PROCEDURE — 99285 EMERGENCY DEPT VISIT HI MDM: CPT | Mod: 25

## 2017-08-19 PROCEDURE — 93010 ELECTROCARDIOGRAM REPORT: CPT

## 2017-08-19 RX ORDER — ACETAMINOPHEN 500 MG
975 TABLET ORAL ONCE
Qty: 0 | Refills: 0 | Status: COMPLETED | OUTPATIENT
Start: 2017-08-19 | End: 2017-08-19

## 2017-08-19 RX ORDER — PIPERACILLIN AND TAZOBACTAM 4; .5 G/20ML; G/20ML
3.38 INJECTION, POWDER, LYOPHILIZED, FOR SOLUTION INTRAVENOUS EVERY 6 HOURS
Qty: 0 | Refills: 0 | Status: DISCONTINUED | OUTPATIENT
Start: 2017-08-19 | End: 2017-08-23

## 2017-08-19 RX ORDER — SODIUM CHLORIDE 9 MG/ML
2000 INJECTION INTRAMUSCULAR; INTRAVENOUS; SUBCUTANEOUS ONCE
Qty: 0 | Refills: 0 | Status: COMPLETED | OUTPATIENT
Start: 2017-08-19 | End: 2017-08-19

## 2017-08-19 RX ORDER — PIPERACILLIN AND TAZOBACTAM 4; .5 G/20ML; G/20ML
3.38 INJECTION, POWDER, LYOPHILIZED, FOR SOLUTION INTRAVENOUS ONCE
Qty: 0 | Refills: 0 | Status: COMPLETED | OUTPATIENT
Start: 2017-08-19 | End: 2017-08-19

## 2017-08-19 RX ORDER — SODIUM CHLORIDE 9 MG/ML
1000 INJECTION, SOLUTION INTRAVENOUS
Qty: 0 | Refills: 0 | Status: DISCONTINUED | OUTPATIENT
Start: 2017-08-19 | End: 2017-08-21

## 2017-08-19 RX ORDER — SODIUM CHLORIDE 9 MG/ML
500 INJECTION INTRAMUSCULAR; INTRAVENOUS; SUBCUTANEOUS ONCE
Qty: 0 | Refills: 0 | Status: COMPLETED | OUTPATIENT
Start: 2017-08-19 | End: 2017-08-19

## 2017-08-19 RX ORDER — ONDANSETRON 8 MG/1
4 TABLET, FILM COATED ORAL EVERY 6 HOURS
Qty: 0 | Refills: 0 | Status: DISCONTINUED | OUTPATIENT
Start: 2017-08-19 | End: 2017-08-23

## 2017-08-19 RX ORDER — HEPARIN SODIUM 5000 [USP'U]/ML
5000 INJECTION INTRAVENOUS; SUBCUTANEOUS EVERY 8 HOURS
Qty: 0 | Refills: 0 | Status: DISCONTINUED | OUTPATIENT
Start: 2017-08-19 | End: 2017-08-23

## 2017-08-19 RX ORDER — SODIUM CHLORIDE 9 MG/ML
1000 INJECTION, SOLUTION INTRAVENOUS
Qty: 0 | Refills: 0 | Status: DISCONTINUED | OUTPATIENT
Start: 2017-08-19 | End: 2017-08-19

## 2017-08-19 RX ADMIN — SODIUM CHLORIDE 1000 MILLILITER(S): 9 INJECTION INTRAMUSCULAR; INTRAVENOUS; SUBCUTANEOUS at 16:20

## 2017-08-19 RX ADMIN — HEPARIN SODIUM 5000 UNIT(S): 5000 INJECTION INTRAVENOUS; SUBCUTANEOUS at 22:45

## 2017-08-19 RX ADMIN — PIPERACILLIN AND TAZOBACTAM 200 GRAM(S): 4; .5 INJECTION, POWDER, LYOPHILIZED, FOR SOLUTION INTRAVENOUS at 23:00

## 2017-08-19 RX ADMIN — Medication 975 MILLIGRAM(S): at 17:23

## 2017-08-19 RX ADMIN — SODIUM CHLORIDE 1000 MILLILITER(S): 9 INJECTION INTRAMUSCULAR; INTRAVENOUS; SUBCUTANEOUS at 23:10

## 2017-08-19 RX ADMIN — PIPERACILLIN AND TAZOBACTAM 200 GRAM(S): 4; .5 INJECTION, POWDER, LYOPHILIZED, FOR SOLUTION INTRAVENOUS at 17:23

## 2017-08-19 NOTE — ED CLERICAL - NS ED CLERK NOTE PRE-ARRIVAL INFORMATION; ADDITIONAL PRE-ARRIVAL INFORMATION
Olu Collado 61yo M outpatient CT at [facility] Radiology w/ SBO and 12cm abd abscess, req surgery Dr. Fong's group if possible, can call him cell [phone]

## 2017-08-19 NOTE — H&P ADULT - ASSESSMENT
62M with hiccoughs, nausea and abdominal distension for 9days here with CT findings of intraabdominal abscess

## 2017-08-19 NOTE — ED PROVIDER NOTE - OBJECTIVE STATEMENT
61 y/o m with pmh of hip replacement two weeks prior by Dr. Donahue presents to ED with c/o nausea, vomiting, abd distention starting almost one week prior.  PT seen by outpatient GI Dr. Dee during week.  Pt sent for ct abd/pelvis today with findings of abd abscess and small bowel obstruction.  Sent to ED for surgical evaluation.  Denies fever, chills.  Pt has had decreased bowel movements, but also decreased appetite.  Hx of colonscopy 2 years prior showing polyps.  Of note pt has also had hiccups intermittently for one week.

## 2017-08-19 NOTE — ED ADULT NURSE NOTE - OBJECTIVE STATEMENT
pt to ER w/ report of CT today revealing sbo and abscess.  Hx recent R. hip replacement.  Pt reports n/v/hiccuping/abd. distention for past week.  Pt reports occasional sob, denies cp/f/c.  Breathing unlabored, skin warm and dry. IV access established, labs drawn and sent. Will continue to monitor.

## 2017-08-19 NOTE — H&P ADULT - HISTORY OF PRESENT ILLNESS
62M hx recent R hip replacement but otherwise healthy, here with hiccoughs and nausea x1 week. Pt reports that around 9 days ago, he developed hiccoughs and some nausea. Pt reports that he saw his gastroenterologist at that time but workup was unremarkable. Hiccoughs and nausea worsened and pt had 1x episode of NB/NB emesis around 7d ago. No fevers or chills. Pt reports he again went to his gastroenterologist who told him to get an outpatient CT, which pt obtained today. CT showed an intraabdominal abscess, and so pt was sent to [facility]. Throughout this whole time, pt reports no pain. Pt currently notes no f/c/v. Some nausea. +hiccoughs. +normal BM this AM, +flatus. No recent travel in the last couple of weeks, although pt is an international  who flies mostly to [place]. Last colonoscopy 2.5yrs ago and notable for a few polyps and questionable diverticulosis. Pt has never had an episode of diverticulitis although reports that it runs in his family

## 2017-08-19 NOTE — H&P ADULT - PROBLEM SELECTOR PLAN 1
-Admit to Dr. White - Team 1 - Regional  -NPO/IVF  -Nausea control PRN  -Zosyn  -IR drainage of intraabdominal abscess  -Seen and D/W Surgery Chief on Call, who D/W Dr. White

## 2017-08-19 NOTE — H&P ADULT - NSHPPHYSICALEXAM_GEN_ALL_CORE
Gen: NAD  CV: Tachycardic, RR  Pulm: Regular nonlabored respirations  Abd: Soft, Distended, mild tenderness in left abdomen. Small reducible umbilical hernia noted

## 2017-08-19 NOTE — ED PROVIDER NOTE - MEDICAL DECISION MAKING DETAILS
pt with nausea, vomiting, hiccups, ct oupt shows intraabdominal abscess with SBO - seen by surgery who will admit, iv zosyn started in ED, pt stable - labs on downtime, admitted prior to see all lab results, surgery to follow

## 2017-08-19 NOTE — ED ADULT TRIAGE NOTE - CHIEF COMPLAINT QUOTE
Patient c/o abdominal pain ,nausea and vomiting for 1 week . Went to his PMD 4 days ago , sent him for Ct scan today , showing abdominal abscess. Denies any fever nor chills .

## 2017-08-20 LAB
ANION GAP SERPL CALC-SCNC: 13 MMOL/L — SIGNIFICANT CHANGE UP (ref 5–17)
BUN SERPL-MCNC: 5 MG/DL — LOW (ref 7–23)
CALCIUM SERPL-MCNC: 7.7 MG/DL — LOW (ref 8.4–10.5)
CHLORIDE SERPL-SCNC: 92 MMOL/L — LOW (ref 96–108)
CO2 SERPL-SCNC: 26 MMOL/L — SIGNIFICANT CHANGE UP (ref 22–31)
CREAT SERPL-MCNC: 0.6 MG/DL — SIGNIFICANT CHANGE UP (ref 0.5–1.3)
GLUCOSE SERPL-MCNC: 92 MG/DL — SIGNIFICANT CHANGE UP (ref 70–99)
HCT VFR BLD CALC: 24.9 % — LOW (ref 39–50)
HCT VFR BLD CALC: 27.9 % — LOW (ref 39–50)
HGB BLD-MCNC: 8.6 G/DL — LOW (ref 13–17)
HGB BLD-MCNC: 9.6 G/DL — LOW (ref 13–17)
MAGNESIUM SERPL-MCNC: 1.2 MG/DL — LOW (ref 1.6–2.6)
MCHC RBC-ENTMCNC: 32.5 PG — SIGNIFICANT CHANGE UP (ref 27–34)
MCHC RBC-ENTMCNC: 32.7 PG — SIGNIFICANT CHANGE UP (ref 27–34)
MCHC RBC-ENTMCNC: 34.4 G/DL — SIGNIFICANT CHANGE UP (ref 32–36)
MCHC RBC-ENTMCNC: 34.5 G/DL — SIGNIFICANT CHANGE UP (ref 32–36)
MCV RBC AUTO: 94 FL — SIGNIFICANT CHANGE UP (ref 80–100)
MCV RBC AUTO: 94.9 FL — SIGNIFICANT CHANGE UP (ref 80–100)
PHOSPHATE SERPL-MCNC: 3.3 MG/DL — SIGNIFICANT CHANGE UP (ref 2.5–4.5)
PLATELET # BLD AUTO: 495 K/UL — HIGH (ref 150–400)
PLATELET # BLD AUTO: 499 K/UL — HIGH (ref 150–400)
POTASSIUM SERPL-MCNC: 3.2 MMOL/L — LOW (ref 3.5–5.3)
POTASSIUM SERPL-SCNC: 3.2 MMOL/L — LOW (ref 3.5–5.3)
RBC # BLD: 2.65 M/UL — LOW (ref 4.2–5.8)
RBC # BLD: 2.94 M/UL — LOW (ref 4.2–5.8)
RBC # FLD: 13.9 % — SIGNIFICANT CHANGE UP (ref 10.3–16.9)
RBC # FLD: 14 % — SIGNIFICANT CHANGE UP (ref 10.3–16.9)
SODIUM SERPL-SCNC: 131 MMOL/L — LOW (ref 135–145)
WBC # BLD: 11.3 K/UL — HIGH (ref 3.8–10.5)
WBC # BLD: 9.3 K/UL — SIGNIFICANT CHANGE UP (ref 3.8–10.5)
WBC # FLD AUTO: 11.3 K/UL — HIGH (ref 3.8–10.5)
WBC # FLD AUTO: 9.3 K/UL — SIGNIFICANT CHANGE UP (ref 3.8–10.5)

## 2017-08-20 RX ORDER — MAGNESIUM SULFATE 500 MG/ML
2 VIAL (ML) INJECTION ONCE
Qty: 0 | Refills: 0 | Status: COMPLETED | OUTPATIENT
Start: 2017-08-20 | End: 2017-08-20

## 2017-08-20 RX ORDER — POTASSIUM CHLORIDE 20 MEQ
10 PACKET (EA) ORAL
Qty: 0 | Refills: 0 | Status: DISCONTINUED | OUTPATIENT
Start: 2017-08-20 | End: 2017-08-20

## 2017-08-20 RX ORDER — CHLORPROMAZINE HCL 10 MG
50 TABLET ORAL THREE TIMES A DAY
Qty: 0 | Refills: 0 | Status: DISCONTINUED | OUTPATIENT
Start: 2017-08-20 | End: 2017-08-23

## 2017-08-20 RX ORDER — POTASSIUM CHLORIDE 20 MEQ
40 PACKET (EA) ORAL ONCE
Qty: 0 | Refills: 0 | Status: COMPLETED | OUTPATIENT
Start: 2017-08-20 | End: 2017-08-20

## 2017-08-20 RX ORDER — POTASSIUM CHLORIDE 20 MEQ
10 PACKET (EA) ORAL
Qty: 0 | Refills: 0 | Status: COMPLETED | OUTPATIENT
Start: 2017-08-20 | End: 2017-08-20

## 2017-08-20 RX ADMIN — Medication 50 GRAM(S): at 11:33

## 2017-08-20 RX ADMIN — HEPARIN SODIUM 5000 UNIT(S): 5000 INJECTION INTRAVENOUS; SUBCUTANEOUS at 14:47

## 2017-08-20 RX ADMIN — HEPARIN SODIUM 5000 UNIT(S): 5000 INJECTION INTRAVENOUS; SUBCUTANEOUS at 06:10

## 2017-08-20 RX ADMIN — SODIUM CHLORIDE 120 MILLILITER(S): 9 INJECTION, SOLUTION INTRAVENOUS at 22:36

## 2017-08-20 RX ADMIN — PIPERACILLIN AND TAZOBACTAM 200 GRAM(S): 4; .5 INJECTION, POWDER, LYOPHILIZED, FOR SOLUTION INTRAVENOUS at 22:36

## 2017-08-20 RX ADMIN — Medication 100 MILLIEQUIVALENT(S): at 16:46

## 2017-08-20 RX ADMIN — PIPERACILLIN AND TAZOBACTAM 200 GRAM(S): 4; .5 INJECTION, POWDER, LYOPHILIZED, FOR SOLUTION INTRAVENOUS at 06:10

## 2017-08-20 RX ADMIN — HEPARIN SODIUM 5000 UNIT(S): 5000 INJECTION INTRAVENOUS; SUBCUTANEOUS at 22:36

## 2017-08-20 RX ADMIN — Medication 100 MILLIEQUIVALENT(S): at 15:36

## 2017-08-20 RX ADMIN — Medication 40 MILLIEQUIVALENT(S): at 18:57

## 2017-08-20 RX ADMIN — PIPERACILLIN AND TAZOBACTAM 200 GRAM(S): 4; .5 INJECTION, POWDER, LYOPHILIZED, FOR SOLUTION INTRAVENOUS at 17:34

## 2017-08-20 RX ADMIN — PIPERACILLIN AND TAZOBACTAM 200 GRAM(S): 4; .5 INJECTION, POWDER, LYOPHILIZED, FOR SOLUTION INTRAVENOUS at 11:33

## 2017-08-20 RX ADMIN — Medication 100 MILLIEQUIVALENT(S): at 12:36

## 2017-08-20 RX ADMIN — Medication 50 GRAM(S): at 14:52

## 2017-08-20 NOTE — PROGRESS NOTE ADULT - SUBJECTIVE AND OBJECTIVE BOX
08/19 O/N: Admitted with intramural abscess, IR drainage vs OR. WBC 15.7, Hgb 11.6 08/19 O/N: Admitted with intramural abscess, IR drainage vs OR. WBC 15.7, Hgb 11.6   OVERNIGHT EVENTS:        SUBJECTIVE: Patient complains of diffuse abdominal pain.  Flatus: [] YES [X] NO             Bowel Movement: [ ] YES [X ] NO  Pain (0-10):            Pain Control Adequate: [X ] YES [ ] NO  Nausea: [ ] YES [X ] NO            Vomiting: [ ] YES [X ] NO  Diarrhea: [ ] YES [X ] NO         Constipation: [ ] YES [X ] NO     Chest Pain: [ ] YES [X ] NO    SOB:  [ ] YES [X ] NO    heparin  Injectable 5000 Unit(s) SubCutaneous every 8 hours  metoprolol 12.5 milliGRAM(s) Oral every 6 hours      Vital Signs Last 24 Hrs  T(C): 36.7 (20 Aug 2017 09:00), Max: 37.4 (20 Aug 2017 06:00)  T(F): 98 (20 Aug 2017 09:00), Max: 99.3 (20 Aug 2017 06:00)  HR: 96 (20 Aug 2017 09:00) (92 - 100)  BP: 132/81 (20 Aug 2017 09:00) (117/75 - 144/84)  BP(mean): --  RR: 16 (20 Aug 2017 09:00) (16 - 17)  SpO2: 98% (20 Aug 2017 09:00) (95% - 98%)  I&O's Detail    19 Aug 2017 07:01  -  20 Aug 2017 07:00  --------------------------------------------------------  IN:    Oral Fluid: 920 mL    sodium chloride 0.9%.: 500 mL    TPN (Total Parenteral Nutrition): 630 mL  Total IN: 2050 mL    OUT:    Drain: 600 mL    Voided: 2600 mL  Total OUT: 3200 mL    Total NET: -1150 mL      20 Aug 2017 07:01  -  20 Aug 2017 09:59  --------------------------------------------------------  IN:    sodium chloride 0.9%.: 150 mL    TPN (Total Parenteral Nutrition): 189 mL  Total IN: 339 mL    OUT:  Total OUT: 0 mL    Total NET: 339 mL          General: NAD, resting comfortably in bed  C/V: NSR  Pulm: Nonlabored breathing, no respiratory distress  Abd: soft, NT/ND.  Extrem: WWP, no edema, SCDs in place  Drains:  Javier:      LABS:                        10.0   9.1   )-----------( 369      ( 19 Aug 2017 07:14 )             30.4     08-19    135  |  98  |  14  ----------------------------<  121<H>  4.3   |  26  |  0.40<L>    Ca    8.4      19 Aug 2017 07:14  Phos  4.2     08-19  Mg     1.7     08-19            RADIOLOGY & ADDITIONAL STUDIES:

## 2017-08-20 NOTE — PROGRESS NOTE ADULT - ASSESSMENT
62M with hiccoughs, nausea and abdominal distension for 9days here with CT findings of intramural abscess abscess    -NPO/IVF  -Nausea control PRN  -Zosyn  -IR drainage of intraabdominal abscess 62M with hiccoughs, nausea and abdominal distension for 9days here with CT findings of intramural abscess abscess    -NPO/IVF  -Nausea control PRN  -Zosyn  - OR

## 2017-08-21 LAB
ANION GAP SERPL CALC-SCNC: 9 MMOL/L — SIGNIFICANT CHANGE UP (ref 5–17)
BUN SERPL-MCNC: 3 MG/DL — LOW (ref 7–23)
CALCIUM SERPL-MCNC: 8.2 MG/DL — LOW (ref 8.4–10.5)
CHLORIDE SERPL-SCNC: 99 MMOL/L — SIGNIFICANT CHANGE UP (ref 96–108)
CO2 SERPL-SCNC: 27 MMOL/L — SIGNIFICANT CHANGE UP (ref 22–31)
CREAT SERPL-MCNC: 0.7 MG/DL — SIGNIFICANT CHANGE UP (ref 0.5–1.3)
GLUCOSE SERPL-MCNC: 127 MG/DL — HIGH (ref 70–99)
HCT VFR BLD CALC: 26.8 % — LOW (ref 39–50)
HGB BLD-MCNC: 9.1 G/DL — LOW (ref 13–17)
MAGNESIUM SERPL-MCNC: 1.7 MG/DL — SIGNIFICANT CHANGE UP (ref 1.6–2.6)
MCHC RBC-ENTMCNC: 32.9 PG — SIGNIFICANT CHANGE UP (ref 27–34)
MCHC RBC-ENTMCNC: 34 G/DL — SIGNIFICANT CHANGE UP (ref 32–36)
MCV RBC AUTO: 96.8 FL — SIGNIFICANT CHANGE UP (ref 80–100)
PHOSPHATE SERPL-MCNC: 2.8 MG/DL — SIGNIFICANT CHANGE UP (ref 2.5–4.5)
PLATELET # BLD AUTO: 510 K/UL — HIGH (ref 150–400)
POTASSIUM SERPL-MCNC: 3.7 MMOL/L — SIGNIFICANT CHANGE UP (ref 3.5–5.3)
POTASSIUM SERPL-SCNC: 3.7 MMOL/L — SIGNIFICANT CHANGE UP (ref 3.5–5.3)
RBC # BLD: 2.77 M/UL — LOW (ref 4.2–5.8)
RBC # FLD: 13.8 % — SIGNIFICANT CHANGE UP (ref 10.3–16.9)
SODIUM SERPL-SCNC: 135 MMOL/L — SIGNIFICANT CHANGE UP (ref 135–145)
WBC # BLD: 6.3 K/UL — SIGNIFICANT CHANGE UP (ref 3.8–10.5)
WBC # FLD AUTO: 6.3 K/UL — SIGNIFICANT CHANGE UP (ref 3.8–10.5)

## 2017-08-21 RX ORDER — POTASSIUM CHLORIDE 20 MEQ
10 PACKET (EA) ORAL
Qty: 0 | Refills: 0 | Status: COMPLETED | OUTPATIENT
Start: 2017-08-21 | End: 2017-08-21

## 2017-08-21 RX ORDER — SODIUM CHLORIDE 9 MG/ML
1000 INJECTION, SOLUTION INTRAVENOUS
Qty: 0 | Refills: 0 | Status: DISCONTINUED | OUTPATIENT
Start: 2017-08-21 | End: 2017-08-23

## 2017-08-21 RX ORDER — MAGNESIUM SULFATE 500 MG/ML
1 VIAL (ML) INJECTION ONCE
Qty: 0 | Refills: 0 | Status: COMPLETED | OUTPATIENT
Start: 2017-08-21 | End: 2017-08-21

## 2017-08-21 RX ADMIN — HEPARIN SODIUM 5000 UNIT(S): 5000 INJECTION INTRAVENOUS; SUBCUTANEOUS at 22:11

## 2017-08-21 RX ADMIN — PIPERACILLIN AND TAZOBACTAM 200 GRAM(S): 4; .5 INJECTION, POWDER, LYOPHILIZED, FOR SOLUTION INTRAVENOUS at 12:04

## 2017-08-21 RX ADMIN — PIPERACILLIN AND TAZOBACTAM 200 GRAM(S): 4; .5 INJECTION, POWDER, LYOPHILIZED, FOR SOLUTION INTRAVENOUS at 17:22

## 2017-08-21 RX ADMIN — HEPARIN SODIUM 5000 UNIT(S): 5000 INJECTION INTRAVENOUS; SUBCUTANEOUS at 05:18

## 2017-08-21 RX ADMIN — PIPERACILLIN AND TAZOBACTAM 200 GRAM(S): 4; .5 INJECTION, POWDER, LYOPHILIZED, FOR SOLUTION INTRAVENOUS at 04:03

## 2017-08-21 RX ADMIN — Medication 50 MILLIGRAM(S): at 19:24

## 2017-08-21 RX ADMIN — Medication 100 MILLIEQUIVALENT(S): at 14:34

## 2017-08-21 RX ADMIN — SODIUM CHLORIDE 60 MILLILITER(S): 9 INJECTION, SOLUTION INTRAVENOUS at 17:22

## 2017-08-21 RX ADMIN — Medication 100 MILLIEQUIVALENT(S): at 12:05

## 2017-08-21 RX ADMIN — PIPERACILLIN AND TAZOBACTAM 200 GRAM(S): 4; .5 INJECTION, POWDER, LYOPHILIZED, FOR SOLUTION INTRAVENOUS at 22:11

## 2017-08-21 RX ADMIN — Medication 100 GRAM(S): at 09:48

## 2017-08-21 RX ADMIN — HEPARIN SODIUM 5000 UNIT(S): 5000 INJECTION INTRAVENOUS; SUBCUTANEOUS at 14:34

## 2017-08-21 NOTE — PROGRESS NOTE ADULT - ASSESSMENT
62M with intramural abscess abscess s/p IR drainage POD#1    -Clear liquids/IVF  -pain/nausea control PRN  -Zosyn  -IR drain to gravity  -DVT PPx  -OOB 62M with intramural abscess abscess s/p IR drainage POD#1    -Clear liquids/IVF  -pain/nausea control PRN  -Zosyn  - f/u with [facility]iology for CT disc  -DVT PPx  - awaiting return of bowel function  -OOB 62M with intramural abscess abscess    -Clear liquids/IVF  -pain/nausea control PRN  -Zosyn  - f/u with [facility]iology for CT disc  -DVT PPx  - awaiting return of bowel function  -OOB

## 2017-08-21 NOTE — PROGRESS NOTE ADULT - SUBJECTIVE AND OBJECTIVE BOX
INTERVAL HPI/OVERNIGHT EVENTS: No acute events, repeat CBC improved    STATUS POST:  IR drainage of intramural abscess    POST OPERATIVE DAY #: 1    SUBJECTIVE:  Flatus: [ ] YES [ ] NO             Bowel Movement: [ ] YES [ ] NO  Pain (0-10):            Pain Control Adequate: [ ] YES [ ] NO  Nausea: [ ] YES [ ] NO            Vomiting: [ ] YES [ ] NO  Diarrhea: [ ] YES [ ] NO         Constipation: [ ] YES [ ] NO     Chest Pain: [ ] YES [ ] NO    SOB:  [ ] YES [ ] NO    MEDICATIONS  (STANDING):  heparin  Injectable 5000 Unit(s) SubCutaneous every 8 hours  piperacillin/tazobactam IVPB. 3.375 Gram(s) IV Intermittent every 6 hours  lactated ringers. 1000 milliLiter(s) (120 mL/Hr) IV Continuous <Continuous>    MEDICATIONS  (PRN):  ondansetron Injectable 4 milliGRAM(s) IV Push every 6 hours PRN Nausea  chlorproMAZINE    Tablet 50 milliGRAM(s) Oral three times a day PRN HICCUPS      Vital Signs Last 24 Hrs  T(C): 37.4 (20 Aug 2017 20:50), Max: 37.4 (20 Aug 2017 20:50)  T(F): 99.3 (20 Aug 2017 20:50), Max: 99.3 (20 Aug 2017 20:50)  HR: 98 (20 Aug 2017 20:50) (87 - 100)  BP: 128/74 (20 Aug 2017 20:50) (127/75 - 143/83)  BP(mean): --  RR: 16 (20 Aug 2017 20:50) (16 - 18)  SpO2: 99% (20 Aug 2017 20:50) (97% - 99%)    PHYSICAL EXAM:      Constitutional: A&Ox3    Breasts:    Respiratory: non labored breathing, no respiratory distress    Cardiovascular: NSR, RRR    Gastrointestinal:                 Incision:    Genitourinary:    Extremities: (-) edema                  I&O's Detail    19 Aug 2017 07:01  -  20 Aug 2017 07:00  --------------------------------------------------------  IN:    0.9% NaCl: 500 mL    lactated ringers.: 1260 mL    Solution: 200 mL  Total IN: 1960 mL    OUT:    Voided: 975 mL  Total OUT: 975 mL    Total NET: 985 mL      20 Aug 2017 07:01  -  21 Aug 2017 01:26  --------------------------------------------------------  IN:    lactated ringers.: 2040 mL    Oral Fluid: 530 mL    Solution: 300 mL    Solution: 400 mL  Total IN: 3270 mL    OUT:    Voided: 2000 mL  Total OUT: 2000 mL    Total NET: 1270 mL          LABS:                        9.6    9.3   )-----------( 499      ( 20 Aug 2017 18:32 )             27.9     08-20    131<L>  |  92<L>  |  5<L>  ----------------------------<  92  3.2<L>   |  26  |  0.60    Ca    7.7<L>      20 Aug 2017 06:08  Phos  3.3     08-20  Mg     1.2     08-20    TPro  7.7  /  Alb  3.7  /  TBili  1.5<H>  /  DBili  x   /  AST  27  /  ALT  30  /  AlkPhos  71  08-19    PT/INR - ( 19 Aug 2017 22:34 )   PT: 13.6 sec;   INR: 1.22          PTT - ( 19 Aug 2017 22:34 )  PTT:26.1 sec      RADIOLOGY & ADDITIONAL STUDIES: INTERVAL HPI/OVERNIGHT EVENTS: No acute events, repeat CBC improved    STATUS POST:  IR drainage of intramural abscess    POST OPERATIVE DAY #: 1    SUBJECTIVE: Patient examined by the team and chief resident. Patient has no complaints. Patient denies pain  Flatus: [x ] YES [ ] NO             Bowel Movement: [ ] YES [x ] NO  Pain (0-10):   0         Pain Control Adequate: [x ] YES [ ] NO  Nausea: [ ] YES [x ] NO            Vomiting: [ ] YES [x ] NO  Diarrhea: [ ] YES [x ] NO         Constipation: [ ] YES [x ] NO     Chest Pain: [ ] YES [x ] NO    SOB:  [ ] YES [x ] NO    MEDICATIONS  (STANDING):  heparin  Injectable 5000 Unit(s) SubCutaneous every 8 hours  piperacillin/tazobactam IVPB. 3.375 Gram(s) IV Intermittent every 6 hours  lactated ringers. 1000 milliLiter(s) (120 mL/Hr) IV Continuous <Continuous>    MEDICATIONS  (PRN):  ondansetron Injectable 4 milliGRAM(s) IV Push every 6 hours PRN Nausea  chlorproMAZINE    Tablet 50 milliGRAM(s) Oral three times a day PRN HICCUPS      Vital Signs Last 24 Hrs  T(C): 37.4 (20 Aug 2017 20:50), Max: 37.4 (20 Aug 2017 20:50)  T(F): 99.3 (20 Aug 2017 20:50), Max: 99.3 (20 Aug 2017 20:50)  HR: 98 (20 Aug 2017 20:50) (87 - 100)  BP: 128/74 (20 Aug 2017 20:50) (127/75 - 143/83)  BP(mean): --  RR: 16 (20 Aug 2017 20:50) (16 - 18)  SpO2: 99% (20 Aug 2017 20:50) (97% - 99%)    PHYSICAL EXAM:      Constitutional: A&Ox3    Respiratory: non labored breathing, no respiratory distress    Cardiovascular: NSR, RRR    Gastrointestinal: soft. NT. slightly distended, distention has improved since the weekend    Genitourinary:    Extremities: no edema, surgical scar on right hip. SCDs in place                  I&O's Detail    19 Aug 2017 07:01  -  20 Aug 2017 07:00  --------------------------------------------------------  IN:    0.9% NaCl: 500 mL    lactated ringers.: 1260 mL    Solution: 200 mL  Total IN: 1960 mL    OUT:    Voided: 975 mL  Total OUT: 975 mL    Total NET: 985 mL      20 Aug 2017 07:01  -  21 Aug 2017 01:26  --------------------------------------------------------  IN:    lactated ringers.: 2040 mL    Oral Fluid: 530 mL    Solution: 300 mL    Solution: 400 mL  Total IN: 3270 mL    OUT:    Voided: 2000 mL  Total OUT: 2000 mL    Total NET: 1270 mL          LABS:                        9.6    9.3   )-----------( 499      ( 20 Aug 2017 18:32 )             27.9     08-20    131<L>  |  92<L>  |  5<L>  ----------------------------<  92  3.2<L>   |  26  |  0.60    Ca    7.7<L>      20 Aug 2017 06:08  Phos  3.3     08-20  Mg     1.2     08-20    TPro  7.7  /  Alb  3.7  /  TBili  1.5<H>  /  DBili  x   /  AST  27  /  ALT  30  /  AlkPhos  71  08-19    PT/INR - ( 19 Aug 2017 22:34 )   PT: 13.6 sec;   INR: 1.22          PTT - ( 19 Aug 2017 22:34 )  PTT:26.1 sec      RADIOLOGY & ADDITIONAL STUDIES:

## 2017-08-22 ENCOUNTER — APPOINTMENT (OUTPATIENT)
Dept: ORTHOPEDIC SURGERY | Facility: CLINIC | Age: 62
End: 2017-08-22

## 2017-08-22 PROCEDURE — 93010 ELECTROCARDIOGRAM REPORT: CPT

## 2017-08-22 PROCEDURE — 74177 CT ABD & PELVIS W/CONTRAST: CPT | Mod: 26

## 2017-08-22 PROCEDURE — 71010: CPT | Mod: 26

## 2017-08-22 RX ORDER — IOHEXOL 300 MG/ML
50 INJECTION, SOLUTION INTRAVENOUS ONCE
Qty: 0 | Refills: 0 | Status: COMPLETED | OUTPATIENT
Start: 2017-08-22 | End: 2017-08-22

## 2017-08-22 RX ORDER — MULTIVIT WITH MIN/MFOLATE/K2 340-15/3 G
150 POWDER (GRAM) ORAL
Qty: 0 | Refills: 0 | Status: COMPLETED | OUTPATIENT
Start: 2017-08-22 | End: 2017-08-23

## 2017-08-22 RX ORDER — ERYTHROMYCIN ETHYLSUCCINATE 400 MG
1000 TABLET ORAL
Qty: 0 | Refills: 0 | Status: DISCONTINUED | OUTPATIENT
Start: 2017-08-22 | End: 2017-08-23

## 2017-08-22 RX ORDER — NEOMYCIN SULFATE 500 MG/1
1000 TABLET ORAL
Qty: 0 | Refills: 0 | Status: DISCONTINUED | OUTPATIENT
Start: 2017-08-22 | End: 2017-08-23

## 2017-08-22 RX ADMIN — HEPARIN SODIUM 5000 UNIT(S): 5000 INJECTION INTRAVENOUS; SUBCUTANEOUS at 14:04

## 2017-08-22 RX ADMIN — Medication 1000 MILLIGRAM(S): at 14:05

## 2017-08-22 RX ADMIN — PIPERACILLIN AND TAZOBACTAM 200 GRAM(S): 4; .5 INJECTION, POWDER, LYOPHILIZED, FOR SOLUTION INTRAVENOUS at 17:21

## 2017-08-22 RX ADMIN — SODIUM CHLORIDE 60 MILLILITER(S): 9 INJECTION, SOLUTION INTRAVENOUS at 15:50

## 2017-08-22 RX ADMIN — Medication 150 MILLILITER(S): at 14:04

## 2017-08-22 RX ADMIN — Medication 1000 MILLIGRAM(S): at 21:51

## 2017-08-22 RX ADMIN — Medication 150 MILLILITER(S): at 23:31

## 2017-08-22 RX ADMIN — Medication 1000 MILLIGRAM(S): at 15:50

## 2017-08-22 RX ADMIN — PIPERACILLIN AND TAZOBACTAM 200 GRAM(S): 4; .5 INJECTION, POWDER, LYOPHILIZED, FOR SOLUTION INTRAVENOUS at 05:20

## 2017-08-22 RX ADMIN — PIPERACILLIN AND TAZOBACTAM 200 GRAM(S): 4; .5 INJECTION, POWDER, LYOPHILIZED, FOR SOLUTION INTRAVENOUS at 10:03

## 2017-08-22 RX ADMIN — NEOMYCIN SULFATE 1000 MILLIGRAM(S): 500 TABLET ORAL at 15:50

## 2017-08-22 RX ADMIN — NEOMYCIN SULFATE 1000 MILLIGRAM(S): 500 TABLET ORAL at 21:51

## 2017-08-22 RX ADMIN — PIPERACILLIN AND TAZOBACTAM 200 GRAM(S): 4; .5 INJECTION, POWDER, LYOPHILIZED, FOR SOLUTION INTRAVENOUS at 23:32

## 2017-08-22 RX ADMIN — SODIUM CHLORIDE 60 MILLILITER(S): 9 INJECTION, SOLUTION INTRAVENOUS at 05:20

## 2017-08-22 RX ADMIN — IOHEXOL 50 MILLILITER(S): 300 INJECTION, SOLUTION INTRAVENOUS at 07:15

## 2017-08-22 RX ADMIN — NEOMYCIN SULFATE 1000 MILLIGRAM(S): 500 TABLET ORAL at 14:05

## 2017-08-22 RX ADMIN — HEPARIN SODIUM 5000 UNIT(S): 5000 INJECTION INTRAVENOUS; SUBCUTANEOUS at 21:51

## 2017-08-22 RX ADMIN — SODIUM CHLORIDE 60 MILLILITER(S): 9 INJECTION, SOLUTION INTRAVENOUS at 23:32

## 2017-08-22 RX ADMIN — HEPARIN SODIUM 5000 UNIT(S): 5000 INJECTION INTRAVENOUS; SUBCUTANEOUS at 05:20

## 2017-08-22 NOTE — PROGRESS NOTE ADULT - SUBJECTIVE AND OBJECTIVE BOX
O/N: HEMANT  8/21: Dr. White to speak with outpatient radiologist O/N: HEMANT  8/21: Dr. White to speak with outpatient radiologist    Patient seen and examined bedside with chief resident. Patient has no complaints. Patient denies chest pain, shortness of breath, and abdominal pain. Patient has PO contrast at bedside to drink for CT of abdomen today.    piperacillin/tazobactam IVPB. 3.375 Gram(s) IV Intermittent every 6 hours      Vital Signs Last 24 Hrs  T(C): 38.2 (22 Aug 2017 05:20), Max: 38.2 (22 Aug 2017 05:20)  T(F): 100.7 (22 Aug 2017 05:20), Max: 100.7 (22 Aug 2017 05:20)  HR: 99 (22 Aug 2017 05:20) (86 - 112)  BP: 116/73 (22 Aug 2017 05:20) (116/73 - 147/87)  BP(mean): --  RR: 18 (22 Aug 2017 05:20) (16 - 19)  SpO2: 97% (22 Aug 2017 05:20) (97% - 99%)    I&O's Detail    21 Aug 2017 07:01  -  22 Aug 2017 07:00  --------------------------------------------------------  IN:    dextrose 5% + sodium chloride 0.45%.: 600 mL    Oral Fluid: 1060 mL    Solution: 200 mL  Total IN: 1860 mL    OUT:    Voided: 3150 mL  Total OUT: 3150 mL    Total NET: -1290 mL      22 Aug 2017 07:01  -  22 Aug 2017 10:09  --------------------------------------------------------  IN:    Oral Fluid: 249 mL  Total IN: 249 mL    OUT:    Voided: 400 mL  Total OUT: 400 mL    Total NET: -151 mL        PHYSICAL EXAM:      Constitutional: NAD. resting comfortably in bed.    Gastrointestinal: soft. NT. minimally distended.       Extremities: no edema. SCDs in place

## 2017-08-22 NOTE — DIETITIAN INITIAL EVALUATION ADULT. - ENERGY NEEDS
Admission weight used as pt is within ^% ideal body weight.   Increased protein needs secondary to current medical state.

## 2017-08-22 NOTE — DIETITIAN INITIAL EVALUATION ADULT. - OTHER INFO
Pt admitted with nausea and abdominal distention x9 days; CT shows intramural abscess.  Pt has been advanced to Clear Liquids diet with fair tolerance.  Pt with fair appetite at present.  Untouched breakfast tray at bedside.  Pt with no recent complaints of GI distress; pain is being managed.  Skin: intact.

## 2017-08-22 NOTE — PROGRESS NOTE ADULT - ASSESSMENT
62M with hiccoughs, nausea and abdominal distension for 9days here with CT findings of intramural abscess abscess    -NPO/IVF  -Nausea control PRN  -Zosyn  -IR drainage of intraabdominal abscess  -CT abd with IV/PO contrast 62M with hiccoughs, nausea and abdominal distension for 9days here with CT findings of intramural abscess abscess    -NPO/IVF  -Nausea control PRN  -Zosyn  -CT abd with IV/PO contrast 62M with hiccoughs, nausea and abdominal distension for 9days here with CT findings of intramural abscess abscess    -NPO/IVF  -Nausea control PRN  -Zosyn  -CT abd with IV/PO contrast  -hyponatremia and hypomagnesemia which are correct with IVF and magnesium sulfate repletion

## 2017-08-23 ENCOUNTER — RESULT REVIEW (OUTPATIENT)
Age: 62
End: 2017-08-23

## 2017-08-23 LAB
ALBUMIN SERPL ELPH-MCNC: 2 G/DL — LOW (ref 3.3–5)
ALP SERPL-CCNC: 43 U/L — SIGNIFICANT CHANGE UP (ref 40–120)
ALT FLD-CCNC: 32 U/L — SIGNIFICANT CHANGE UP (ref 10–45)
ANION GAP SERPL CALC-SCNC: 13 MMOL/L — SIGNIFICANT CHANGE UP (ref 5–17)
ANION GAP SERPL CALC-SCNC: 9 MMOL/L — SIGNIFICANT CHANGE UP (ref 5–17)
APTT BLD: 21.7 SEC — LOW (ref 27.5–37.4)
APTT BLD: 25.2 SEC — LOW (ref 27.5–37.4)
AST SERPL-CCNC: 46 U/L — HIGH (ref 10–40)
BASE EXCESS BLDA CALC-SCNC: -10.1 MMOL/L — LOW (ref -2–3)
BASE EXCESS BLDA CALC-SCNC: -4.6 MMOL/L — LOW (ref -2–3)
BASE EXCESS BLDA CALC-SCNC: -5.9 MMOL/L — LOW (ref -2–3)
BASOPHILS NFR BLD AUTO: 0.1 % — SIGNIFICANT CHANGE UP (ref 0–2)
BILIRUB SERPL-MCNC: 1.4 MG/DL — HIGH (ref 0.2–1.2)
BLD GP AB SCN SERPL QL: NEGATIVE — SIGNIFICANT CHANGE UP
BUN SERPL-MCNC: 1 MG/DL — LOW (ref 7–23)
BUN SERPL-MCNC: 2 MG/DL — LOW (ref 7–23)
CA-I BLDA-SCNC: 0.52 MMOL/L — CRITICAL LOW (ref 1.12–1.3)
CA-I BLDA-SCNC: 1.54 MMOL/L — HIGH (ref 1.12–1.3)
CALCIUM SERPL-MCNC: 8.3 MG/DL — LOW (ref 8.4–10.5)
CALCIUM SERPL-MCNC: 9.7 MG/DL — SIGNIFICANT CHANGE UP (ref 8.4–10.5)
CHLORIDE SERPL-SCNC: 106 MMOL/L — SIGNIFICANT CHANGE UP (ref 96–108)
CHLORIDE SERPL-SCNC: 98 MMOL/L — SIGNIFICANT CHANGE UP (ref 96–108)
CO2 SERPL-SCNC: 19 MMOL/L — LOW (ref 22–31)
CO2 SERPL-SCNC: 24 MMOL/L — SIGNIFICANT CHANGE UP (ref 22–31)
COHGB MFR BLDA: 0.2 % — SIGNIFICANT CHANGE UP
COHGB MFR BLDA: 0.3 % — SIGNIFICANT CHANGE UP
CREAT SERPL-MCNC: 0.5 MG/DL — SIGNIFICANT CHANGE UP (ref 0.5–1.3)
CREAT SERPL-MCNC: 0.7 MG/DL — SIGNIFICANT CHANGE UP (ref 0.5–1.3)
EOSINOPHIL NFR BLD AUTO: 0.6 % — SIGNIFICANT CHANGE UP (ref 0–6)
GAS PNL BLDA: SIGNIFICANT CHANGE UP
GLUCOSE SERPL-MCNC: 127 MG/DL — HIGH (ref 70–99)
GLUCOSE SERPL-MCNC: 159 MG/DL — HIGH (ref 70–99)
HCO3 BLDA-SCNC: 15 MMOL/L — LOW (ref 21–28)
HCO3 BLDA-SCNC: 19 MMOL/L — LOW (ref 21–28)
HCO3 BLDA-SCNC: 20 MMOL/L — LOW (ref 21–28)
HCT VFR BLD CALC: 28.5 % — LOW (ref 39–50)
HCT VFR BLD CALC: 33.2 % — LOW (ref 39–50)
HGB BLD-MCNC: 11.6 G/DL — LOW (ref 13–17)
HGB BLD-MCNC: 9.4 G/DL — LOW (ref 13–17)
HGB BLDA-MCNC: 11 G/DL — LOW (ref 13–17)
HGB BLDA-MCNC: 8.8 G/DL — LOW (ref 13–17)
INR BLD: 1.11 — SIGNIFICANT CHANGE UP (ref 0.88–1.16)
INR BLD: 1.12 — SIGNIFICANT CHANGE UP (ref 0.88–1.16)
LACTATE SERPL-SCNC: 1.1 MMOL/L — SIGNIFICANT CHANGE UP (ref 0.5–2)
LYMPHOCYTES # BLD AUTO: 7.4 % — LOW (ref 13–44)
MAGNESIUM SERPL-MCNC: 1.4 MG/DL — LOW (ref 1.6–2.6)
MAGNESIUM SERPL-MCNC: 2 MG/DL — SIGNIFICANT CHANGE UP (ref 1.6–2.6)
MCHC RBC-ENTMCNC: 32 PG — SIGNIFICANT CHANGE UP (ref 27–34)
MCHC RBC-ENTMCNC: 32 PG — SIGNIFICANT CHANGE UP (ref 27–34)
MCHC RBC-ENTMCNC: 33 G/DL — SIGNIFICANT CHANGE UP (ref 32–36)
MCHC RBC-ENTMCNC: 34.9 G/DL — SIGNIFICANT CHANGE UP (ref 32–36)
MCV RBC AUTO: 91.5 FL — SIGNIFICANT CHANGE UP (ref 80–100)
MCV RBC AUTO: 96.9 FL — SIGNIFICANT CHANGE UP (ref 80–100)
METHGB MFR BLDA: 0.2 % — SIGNIFICANT CHANGE UP
METHGB MFR BLDA: 0.3 % — SIGNIFICANT CHANGE UP
MONOCYTES NFR BLD AUTO: 6.5 % — SIGNIFICANT CHANGE UP (ref 2–14)
NEUTROPHILS NFR BLD AUTO: 85.4 % — HIGH (ref 43–77)
O2 CT VFR BLDA CALC: SIGNIFICANT CHANGE UP (ref 15–23)
O2 CT VFR BLDA CALC: SIGNIFICANT CHANGE UP (ref 15–23)
OXYHGB MFR BLDA: 99 % — SIGNIFICANT CHANGE UP (ref 94–100)
OXYHGB MFR BLDA: 99 % — SIGNIFICANT CHANGE UP (ref 94–100)
PCO2 BLDA: 28 MMHG — LOW (ref 35–48)
PCO2 BLDA: 34 MMHG — LOW (ref 35–48)
PCO2 BLDA: 36 MMHG — SIGNIFICANT CHANGE UP (ref 35–48)
PH BLDA: 7.33 — LOW (ref 7.35–7.45)
PH BLDA: 7.36 — SIGNIFICANT CHANGE UP (ref 7.35–7.45)
PH BLDA: 7.37 — SIGNIFICANT CHANGE UP (ref 7.35–7.45)
PHOSPHATE SERPL-MCNC: 3.9 MG/DL — SIGNIFICANT CHANGE UP (ref 2.5–4.5)
PHOSPHATE SERPL-MCNC: 4.9 MG/DL — HIGH (ref 2.5–4.5)
PLATELET # BLD AUTO: 398 K/UL — SIGNIFICANT CHANGE UP (ref 150–400)
PLATELET # BLD AUTO: 556 K/UL — HIGH (ref 150–400)
PO2 BLDA: 197 MMHG — HIGH (ref 83–108)
PO2 BLDA: 280 MMHG — HIGH (ref 83–108)
PO2 BLDA: 304 MMHG — HIGH (ref 83–108)
POTASSIUM BLDA-SCNC: 2.1 MMOL/L — CRITICAL LOW (ref 3.5–4.9)
POTASSIUM BLDA-SCNC: 3.9 MMOL/L — SIGNIFICANT CHANGE UP (ref 3.5–4.9)
POTASSIUM SERPL-MCNC: 3.5 MMOL/L — SIGNIFICANT CHANGE UP (ref 3.5–5.3)
POTASSIUM SERPL-MCNC: 3.9 MMOL/L — SIGNIFICANT CHANGE UP (ref 3.5–5.3)
POTASSIUM SERPL-SCNC: 3.5 MMOL/L — SIGNIFICANT CHANGE UP (ref 3.5–5.3)
POTASSIUM SERPL-SCNC: 3.9 MMOL/L — SIGNIFICANT CHANGE UP (ref 3.5–5.3)
PROT SERPL-MCNC: 4.5 G/DL — LOW (ref 6–8.3)
PROTHROM AB SERPL-ACNC: 12.4 SEC — SIGNIFICANT CHANGE UP (ref 9.8–12.7)
PROTHROM AB SERPL-ACNC: 12.5 SEC — SIGNIFICANT CHANGE UP (ref 9.8–12.7)
RBC # BLD: 2.94 M/UL — LOW (ref 4.2–5.8)
RBC # BLD: 3.63 M/UL — LOW (ref 4.2–5.8)
RBC # FLD: 14 % — SIGNIFICANT CHANGE UP (ref 10.3–16.9)
RBC # FLD: 15.6 % — SIGNIFICANT CHANGE UP (ref 10.3–16.9)
RH IG SCN BLD-IMP: POSITIVE — SIGNIFICANT CHANGE UP
SAO2 % BLDA: 100 % — SIGNIFICANT CHANGE UP (ref 95–100)
SAO2 % BLDA: 100 % — SIGNIFICANT CHANGE UP (ref 95–100)
SAO2 % BLDA: 99 % — SIGNIFICANT CHANGE UP (ref 95–100)
SODIUM BLDA-SCNC: 132 MMOL/L — LOW (ref 138–146)
SODIUM BLDA-SCNC: 145 MMOL/L — SIGNIFICANT CHANGE UP (ref 138–146)
SODIUM SERPL-SCNC: 134 MMOL/L — LOW (ref 135–145)
SODIUM SERPL-SCNC: 135 MMOL/L — SIGNIFICANT CHANGE UP (ref 135–145)
WBC # BLD: 5.6 K/UL — SIGNIFICANT CHANGE UP (ref 3.8–10.5)
WBC # BLD: 8.9 K/UL — SIGNIFICANT CHANGE UP (ref 3.8–10.5)
WBC # FLD AUTO: 5.6 K/UL — SIGNIFICANT CHANGE UP (ref 3.8–10.5)
WBC # FLD AUTO: 8.9 K/UL — SIGNIFICANT CHANGE UP (ref 3.8–10.5)

## 2017-08-23 RX ORDER — INSULIN LISPRO 100/ML
VIAL (ML) SUBCUTANEOUS
Qty: 0 | Refills: 0 | Status: DISCONTINUED | OUTPATIENT
Start: 2017-08-23 | End: 2017-08-24

## 2017-08-23 RX ORDER — CHLORHEXIDINE GLUCONATE 213 G/1000ML
15 SOLUTION TOPICAL
Qty: 0 | Refills: 0 | Status: DISCONTINUED | OUTPATIENT
Start: 2017-08-23 | End: 2017-08-24

## 2017-08-23 RX ORDER — SODIUM CHLORIDE 9 MG/ML
1000 INJECTION, SOLUTION INTRAVENOUS
Qty: 0 | Refills: 0 | Status: DISCONTINUED | OUTPATIENT
Start: 2017-08-23 | End: 2017-08-26

## 2017-08-23 RX ORDER — PANTOPRAZOLE SODIUM 20 MG/1
40 TABLET, DELAYED RELEASE ORAL DAILY
Qty: 0 | Refills: 0 | Status: DISCONTINUED | OUTPATIENT
Start: 2017-08-24 | End: 2017-08-28

## 2017-08-23 RX ORDER — PIPERACILLIN AND TAZOBACTAM 4; .5 G/20ML; G/20ML
3.38 INJECTION, POWDER, LYOPHILIZED, FOR SOLUTION INTRAVENOUS EVERY 6 HOURS
Qty: 0 | Refills: 0 | Status: DISCONTINUED | OUTPATIENT
Start: 2017-08-23 | End: 2017-08-29

## 2017-08-23 RX ORDER — MAGNESIUM SULFATE 500 MG/ML
2 VIAL (ML) INJECTION
Qty: 0 | Refills: 0 | Status: COMPLETED | OUTPATIENT
Start: 2017-08-23 | End: 2017-08-24

## 2017-08-23 RX ORDER — PROPOFOL 10 MG/ML
1 INJECTION, EMULSION INTRAVENOUS
Qty: 1000 | Refills: 0 | Status: DISCONTINUED | OUTPATIENT
Start: 2017-08-23 | End: 2017-08-24

## 2017-08-23 RX ORDER — POTASSIUM CHLORIDE 20 MEQ
10 PACKET (EA) ORAL
Qty: 0 | Refills: 0 | Status: COMPLETED | OUTPATIENT
Start: 2017-08-23 | End: 2017-08-23

## 2017-08-23 RX ORDER — SODIUM CHLORIDE 9 MG/ML
1000 INJECTION, SOLUTION INTRAVENOUS
Qty: 0 | Refills: 0 | Status: DISCONTINUED | OUTPATIENT
Start: 2017-08-23 | End: 2017-08-23

## 2017-08-23 RX ORDER — FENTANYL CITRATE 50 UG/ML
1 INJECTION INTRAVENOUS
Qty: 2500 | Refills: 0 | Status: DISCONTINUED | OUTPATIENT
Start: 2017-08-23 | End: 2017-08-24

## 2017-08-23 RX ADMIN — FENTANYL CITRATE 8.16 MICROGRAM(S)/KG/HR: 50 INJECTION INTRAVENOUS at 23:20

## 2017-08-23 RX ADMIN — PIPERACILLIN AND TAZOBACTAM 200 GRAM(S): 4; .5 INJECTION, POWDER, LYOPHILIZED, FOR SOLUTION INTRAVENOUS at 14:16

## 2017-08-23 RX ADMIN — PROPOFOL 0.49 MICROGRAM(S)/KG/MIN: 10 INJECTION, EMULSION INTRAVENOUS at 23:20

## 2017-08-23 RX ADMIN — Medication 100 MILLIEQUIVALENT(S): at 11:19

## 2017-08-23 RX ADMIN — Medication 100 MILLIEQUIVALENT(S): at 10:18

## 2017-08-23 RX ADMIN — PIPERACILLIN AND TAZOBACTAM 200 GRAM(S): 4; .5 INJECTION, POWDER, LYOPHILIZED, FOR SOLUTION INTRAVENOUS at 05:53

## 2017-08-23 RX ADMIN — Medication 100 MILLIEQUIVALENT(S): at 14:15

## 2017-08-23 RX ADMIN — PIPERACILLIN AND TAZOBACTAM 200 GRAM(S): 4; .5 INJECTION, POWDER, LYOPHILIZED, FOR SOLUTION INTRAVENOUS at 23:21

## 2017-08-23 RX ADMIN — HEPARIN SODIUM 5000 UNIT(S): 5000 INJECTION INTRAVENOUS; SUBCUTANEOUS at 05:54

## 2017-08-23 NOTE — PROGRESS NOTE ADULT - SUBJECTIVE AND OBJECTIVE BOX
O/N: HEMANT, VSS, bowel prepped  8/22: CT abd with PO/IV contrast, mag citrate 2 bottles after CT, linette.erythro bowel prep for tomorrow, NPO at midnight O/N: HEMANT, VSS, bowel prepped  8/22: CT abd with PO/IV contrast, mag citrate 2 bottles after CT, linette.erythro bowel prep for tomorrow, NPO at midnight    Patient seen and examined bedside with chief resident    piperacillin/tazobactam IVPB. 3.375 Gram(s) IV Intermittent every 6 hours  erythromycin     base Tablet 1000 milliGRAM(s) Oral <User Schedule>  neomycin 1000 milliGRAM(s) Oral <User Schedule>      Vital Signs Last 24 Hrs  T(C): 36.8 (23 Aug 2017 05:43), Max: 37.6 (22 Aug 2017 13:37)  T(F): 98.3 (23 Aug 2017 05:43), Max: 99.6 (22 Aug 2017 13:37)  HR: 89 (23 Aug 2017 05:43) (89 - 104)  BP: 123/78 (23 Aug 2017 05:43) (120/97 - 133/83)  BP(mean): --  RR: 18 (23 Aug 2017 05:43) (16 - 19)  SpO2: 98% (23 Aug 2017 05:43) (96% - 100%)    I&O's Detail    22 Aug 2017 07:01  -  23 Aug 2017 07:00  --------------------------------------------------------  IN:    dextrose 5% + sodium chloride 0.45%.: 690 mL    Oral Fluid: 1339 mL    Solution: 150 mL  Total IN: 2179 mL    OUT:    Voided: 2100 mL  Total OUT: 2100 mL    Total NET: 79 mL        PHYSICAL EXAM:      Constitutional:    Gastrointestinal:    Genitourinary:    Extremities: O/N: HEMANT, VSS, bowel prepped  8/22: CT abd with PO/IV contrast, mag citrate 2 bottles after CT, linette.erythro bowel prep for tomorrow, NPO at midnight    Patient seen and examined bedside with chief resident. Patient has no complaints. Patient denies abdominal pain, chest pain, SOB, nausea, and vomiting.    piperacillin/tazobactam IVPB. 3.375 Gram(s) IV Intermittent every 6 hours  erythromycin     base Tablet 1000 milliGRAM(s) Oral <User Schedule>  neomycin 1000 milliGRAM(s) Oral <User Schedule>      Vital Signs Last 24 Hrs  T(C): 36.8 (23 Aug 2017 05:43), Max: 37.6 (22 Aug 2017 13:37)  T(F): 98.3 (23 Aug 2017 05:43), Max: 99.6 (22 Aug 2017 13:37)  HR: 89 (23 Aug 2017 05:43) (89 - 104)  BP: 123/78 (23 Aug 2017 05:43) (120/97 - 133/83)  BP(mean): --  RR: 18 (23 Aug 2017 05:43) (16 - 19)  SpO2: 98% (23 Aug 2017 05:43) (96% - 100%)    I&O's Detail    22 Aug 2017 07:01  -  23 Aug 2017 07:00  --------------------------------------------------------  IN:    dextrose 5% + sodium chloride 0.45%.: 690 mL    Oral Fluid: 1339 mL    Solution: 150 mL  Total IN: 2179 mL    OUT:    Voided: 2100 mL  Total OUT: 2100 mL    Total NET: 79 mL        PHYSICAL EXAM:      Constitutional: NAD. resting comfortably in bed.    Gastrointestinal: soft. NT. slightly distended.    Extremities: no edema, SCDs in place.

## 2017-08-23 NOTE — PROGRESS NOTE ADULT - ASSESSMENT
62M with hiccoughs, nausea and abdominal distension for 9days here with CT findings of intramural abscess  -NPO/IVF  -Nausea control PRN  -Zosyn  -IR drainage of intraabdominal abscess  -bowel prepped 62M with hiccoughs, nausea and abdominal distension for 9days here with CT findings of intramural abscess    -NPO/IVF  -Nausea control PRN  -Zosyn  -bowel prepped  -preop CXR and EKG done, WNL  -plan for OR at 2:30pm

## 2017-08-23 NOTE — BRIEF OPERATIVE NOTE - OPERATION/FINDINGS
Procedure: Robotic converted to open left hemicolectomy with hand sewn end to end anastomosis.  Small bowel resection.  Serosal injury repaired primarily. 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  Chronic abscess cavity with fibrinous exudate throughout small bowel.

## 2017-08-24 LAB
ALBUMIN SERPL ELPH-MCNC: 1.9 G/DL — LOW (ref 3.3–5)
ALP SERPL-CCNC: 41 U/L — SIGNIFICANT CHANGE UP (ref 40–120)
ALT FLD-CCNC: 28 U/L — SIGNIFICANT CHANGE UP (ref 10–45)
ANION GAP SERPL CALC-SCNC: 13 MMOL/L — SIGNIFICANT CHANGE UP (ref 5–17)
AST SERPL-CCNC: 29 U/L — SIGNIFICANT CHANGE UP (ref 10–40)
BILIRUB SERPL-MCNC: 0.9 MG/DL — SIGNIFICANT CHANGE UP (ref 0.2–1.2)
BUN SERPL-MCNC: 4 MG/DL — LOW (ref 7–23)
CALCIUM SERPL-MCNC: 8.2 MG/DL — LOW (ref 8.4–10.5)
CHLORIDE SERPL-SCNC: 104 MMOL/L — SIGNIFICANT CHANGE UP (ref 96–108)
CO2 SERPL-SCNC: 19 MMOL/L — LOW (ref 22–31)
CREAT SERPL-MCNC: 0.6 MG/DL — SIGNIFICANT CHANGE UP (ref 0.5–1.3)
GLUCOSE SERPL-MCNC: 147 MG/DL — HIGH (ref 70–99)
HCT VFR BLD CALC: 31.9 % — LOW (ref 39–50)
HGB BLD-MCNC: 10.9 G/DL — LOW (ref 13–17)
MAGNESIUM SERPL-MCNC: 2.1 MG/DL — SIGNIFICANT CHANGE UP (ref 1.6–2.6)
MCHC RBC-ENTMCNC: 31.7 PG — SIGNIFICANT CHANGE UP (ref 27–34)
MCHC RBC-ENTMCNC: 34.2 G/DL — SIGNIFICANT CHANGE UP (ref 32–36)
MCV RBC AUTO: 92.7 FL — SIGNIFICANT CHANGE UP (ref 80–100)
PHOSPHATE SERPL-MCNC: 4.5 MG/DL — SIGNIFICANT CHANGE UP (ref 2.5–4.5)
PLATELET # BLD AUTO: 393 K/UL — SIGNIFICANT CHANGE UP (ref 150–400)
POTASSIUM SERPL-MCNC: 4.5 MMOL/L — SIGNIFICANT CHANGE UP (ref 3.5–5.3)
POTASSIUM SERPL-SCNC: 4.5 MMOL/L — SIGNIFICANT CHANGE UP (ref 3.5–5.3)
PROT SERPL-MCNC: 4.5 G/DL — LOW (ref 6–8.3)
RBC # BLD: 3.44 M/UL — LOW (ref 4.2–5.8)
RBC # FLD: 16.5 % — SIGNIFICANT CHANGE UP (ref 10.3–16.9)
SODIUM SERPL-SCNC: 136 MMOL/L — SIGNIFICANT CHANGE UP (ref 135–145)
WBC # BLD: 15.1 K/UL — HIGH (ref 3.8–10.5)
WBC # FLD AUTO: 15.1 K/UL — HIGH (ref 3.8–10.5)

## 2017-08-24 PROCEDURE — 99232 SBSQ HOSP IP/OBS MODERATE 35: CPT | Mod: GC

## 2017-08-24 PROCEDURE — 71010: CPT | Mod: 26

## 2017-08-24 RX ORDER — HEPARIN SODIUM 5000 [USP'U]/ML
5000 INJECTION INTRAVENOUS; SUBCUTANEOUS EVERY 8 HOURS
Qty: 0 | Refills: 0 | Status: DISCONTINUED | OUTPATIENT
Start: 2017-08-24 | End: 2017-08-29

## 2017-08-24 RX ORDER — SODIUM CHLORIDE 9 MG/ML
1000 INJECTION INTRAMUSCULAR; INTRAVENOUS; SUBCUTANEOUS ONCE
Qty: 0 | Refills: 0 | Status: COMPLETED | OUTPATIENT
Start: 2017-08-24 | End: 2017-08-24

## 2017-08-24 RX ORDER — HYDROMORPHONE HYDROCHLORIDE 2 MG/ML
0.5 INJECTION INTRAMUSCULAR; INTRAVENOUS; SUBCUTANEOUS EVERY 4 HOURS
Qty: 0 | Refills: 0 | Status: DISCONTINUED | OUTPATIENT
Start: 2017-08-24 | End: 2017-08-29

## 2017-08-24 RX ORDER — ONDANSETRON 8 MG/1
4 TABLET, FILM COATED ORAL ONCE
Qty: 0 | Refills: 0 | Status: COMPLETED | OUTPATIENT
Start: 2017-08-24 | End: 2017-08-24

## 2017-08-24 RX ORDER — HYDROMORPHONE HYDROCHLORIDE 2 MG/ML
1 INJECTION INTRAMUSCULAR; INTRAVENOUS; SUBCUTANEOUS EVERY 4 HOURS
Qty: 0 | Refills: 0 | Status: DISCONTINUED | OUTPATIENT
Start: 2017-08-24 | End: 2017-08-29

## 2017-08-24 RX ADMIN — SODIUM CHLORIDE 2000 MILLILITER(S): 9 INJECTION INTRAMUSCULAR; INTRAVENOUS; SUBCUTANEOUS at 05:30

## 2017-08-24 RX ADMIN — PIPERACILLIN AND TAZOBACTAM 200 GRAM(S): 4; .5 INJECTION, POWDER, LYOPHILIZED, FOR SOLUTION INTRAVENOUS at 17:06

## 2017-08-24 RX ADMIN — PIPERACILLIN AND TAZOBACTAM 200 GRAM(S): 4; .5 INJECTION, POWDER, LYOPHILIZED, FOR SOLUTION INTRAVENOUS at 06:15

## 2017-08-24 RX ADMIN — HEPARIN SODIUM 5000 UNIT(S): 5000 INJECTION INTRAVENOUS; SUBCUTANEOUS at 14:14

## 2017-08-24 RX ADMIN — Medication: at 00:22

## 2017-08-24 RX ADMIN — Medication 50 GRAM(S): at 02:00

## 2017-08-24 RX ADMIN — HEPARIN SODIUM 5000 UNIT(S): 5000 INJECTION INTRAVENOUS; SUBCUTANEOUS at 22:07

## 2017-08-24 RX ADMIN — HYDROMORPHONE HYDROCHLORIDE 1 MILLIGRAM(S): 2 INJECTION INTRAMUSCULAR; INTRAVENOUS; SUBCUTANEOUS at 12:03

## 2017-08-24 RX ADMIN — PIPERACILLIN AND TAZOBACTAM 200 GRAM(S): 4; .5 INJECTION, POWDER, LYOPHILIZED, FOR SOLUTION INTRAVENOUS at 12:03

## 2017-08-24 RX ADMIN — ONDANSETRON 4 MILLIGRAM(S): 8 TABLET, FILM COATED ORAL at 20:33

## 2017-08-24 RX ADMIN — CHLORHEXIDINE GLUCONATE 15 MILLILITER(S): 213 SOLUTION TOPICAL at 06:15

## 2017-08-24 RX ADMIN — SODIUM CHLORIDE 125 MILLILITER(S): 9 INJECTION, SOLUTION INTRAVENOUS at 22:07

## 2017-08-24 RX ADMIN — PANTOPRAZOLE SODIUM 40 MILLIGRAM(S): 20 TABLET, DELAYED RELEASE ORAL at 12:03

## 2017-08-24 RX ADMIN — SODIUM CHLORIDE 2000 MILLILITER(S): 9 INJECTION INTRAMUSCULAR; INTRAVENOUS; SUBCUTANEOUS at 02:00

## 2017-08-24 RX ADMIN — HYDROMORPHONE HYDROCHLORIDE 1 MILLIGRAM(S): 2 INJECTION INTRAMUSCULAR; INTRAVENOUS; SUBCUTANEOUS at 12:44

## 2017-08-24 RX ADMIN — Medication 50 GRAM(S): at 00:23

## 2017-08-24 NOTE — PROGRESS NOTE ADULT - ASSESSMENT
62M with hiccoughs, nausea and abdominal distension for 9days here with CT findings of intramural abscess now s/p left hemicolectomy, SBR    NEURO: propofol gtt, fentanyl gtt   CV: normotensive goals  PULM: CMV 40/550/12/5  GI/FEN: NPO, Protonix 40, LR @125/hr  : romeo to gravity  ENDO: ISS  ID: [email or web address] (8/23--)  HEME/PPX: SCDs, HSQ in AM  LINES: A line, PIV  Wounds: MICHAEL x1 @ splenic flexure

## 2017-08-24 NOTE — PROGRESS NOTE ADULT - SUBJECTIVE AND OBJECTIVE BOX
Seen and examined at bedside. No overnight events. Mr. Collado reports good pain control. No flatus or BM yet. Tolerated CPAP trial and he was extubated this morning.    Vital Signs Last 24 Hrs  T(C): 36.4 (24 Aug 2017 00:49), Max: 36.9 (23 Aug 2017 09:15)  T(F): 97.6 (24 Aug 2017 00:49), Max: 98.4 (23 Aug 2017 09:15)  HR: 100 (24 Aug 2017 07:00) (80 - 120)  BP: 97/72 (24 Aug 2017 07:00) (81/56 - 174/113)  BP(mean): 80 (24 Aug 2017 07:00) (64 - 134)  RR: 19 (24 Aug 2017 07:00) (10 - 20)  SpO2: 99% (24 Aug 2017 07:00) (68% - 100%)     Physical Exam:  Neurological: extubated, AAOx3  HEENT: NGT in place.  Cardiovascular: RRR, normal S1 and S2  Respiratory: Mild stridor post-extubation.   GI: distended but soft, nontender to palpation.  Soft, midline island dressing with minimal saturation, port sites CDI, staples in place. Left Ab MICHAEL draining serosanguinous fluid.  Extremities: no cyanosis or erythema    CAPILLARY BLOOD GLUCOSE  138 (23 Aug 2017 20:37)  109 (23 Aug 2017 20:03)          08-23 @ 07:01  -  08-24 @ 07:00  --------------------------------------------------------  IN: 4153.3 mL / OUT: 1965 mL / NET: 2188.3 mL      Mode: AC/ CMV (Assist Control/ Continuous Mandatory Ventilation)  RR (machine): 12  TV (machine): 550  FiO2: 40  PEEP: 5  PS: 5  ITime: 1  MAP: 7.5  PIP: 16    piperacillin/tazobactam IVPB. 3.375 Gram(s) IV Intermittent every 6 hours  insulin lispro (HumaLOG) corrective regimen sliding scale   SubCutaneous Before meals and at bedtime  pantoprazole  Injectable 40 milliGRAM(s) IV Push daily  lactated ringers. 1000 milliLiter(s) IV Continuous <Continuous>  chlorhexidine 0.12% Liquid 15 milliLiter(s) Swish and Spit two times a day  HYDROmorphone  Injectable 0.5 milliGRAM(s) IV Push every 4 hours PRN  HYDROmorphone  Injectable 1 milliGRAM(s) IV Push every 4 hours PRN    LABS:  ABG - ( 23 Aug 2017 23:39 )  pH: 7.36  /  pCO2: 34    /  pO2: 197   / HCO3: 19    / Base Excess: -5.9  /  SaO2: 99                  CBC Full  -  ( 24 Aug 2017 06:18 )  WBC Count : 15.1 K/uL  Hemoglobin : 10.9 g/dL  Hematocrit : 31.9 %  Platelet Count - Automated : 393 K/uL  Mean Cell Volume : 92.7 fL  Mean Cell Hemoglobin : 31.7 pg  Mean Cell Hemoglobin Concentration : 34.2 g/dL  Auto Neutrophil # : x  Auto Lymphocyte # : x  Auto Monocyte # : x  Auto Eosinophil # : x  Auto Basophil # : x  Auto Neutrophil % : x  Auto Lymphocyte % : x  Auto Monocyte % : x  Auto Eosinophil % : x  Auto Basophil % : x    08-24    136  |  104  |  4<L>  ----------------------------<  147<H>  4.5   |  19<L>  |  0.60    Ca    8.2<L>      24 Aug 2017 06:18  Phos  4.5     08-24  Mg     2.1     08-24    TPro  4.5<L>  /  Alb  1.9<L>  /  TBili  0.9  /  DBili  x   /  AST  29  /  ALT  28  /  AlkPhos  41  08-24    PT/INR - ( 23 Aug 2017 21:03 )   PT: 12.5 sec;   INR: 1.12          PTT - ( 23 Aug 2017 21:03 )  PTT:21.7 sec

## 2017-08-24 NOTE — CONSULT NOTE ADULT - SUBJECTIVE AND OBJECTIVE BOX
HPI:  62M hx recent R hip replacement but otherwise healthy, here with hiccoughs and nausea x1 week. Pt reports that around 9 days ago, he developed hiccoughs and some nausea. Pt reports that he saw his gastroenterologist at that time but workup was unremarkable. Hiccoughs and nausea worsened and pt had 1x episode of NB/NB emesis around 7d ago. No fevers or chills. Pt reports he again went to his gastroenterologist who told him to get an outpatient CT, which pt obtained today. CT showed an intraabdominal abscess, and so pt was sent to [facility]. Throughout this whole time, pt reports no pain. Pt currently notes no f/c/v. Some nausea. +hiccoughs. +normal BM this AM, +flatus. No recent travel in the last couple of weeks, although pt is an international  who flies mostly to [place]. Last colonoscopy 2.5yrs ago and notable for a few polyps and questionable diverticulosis. Pt has never had an episode of diverticulitis although reports that it runs in his family (19 Aug 2017 17:08)      PAST MEDICAL & SURGICAL HISTORY:  No pertinent past medical history  S/P hip replacement, right: 8/2017      ROS: See HPI    MEDICATIONS  (STANDING):  propofol Infusion 1 MICROgram(s)/kG/Min (0.49 mL/Hr) IV Continuous <Continuous>  fentaNYL   Infusion 1 MICROgram(s)/kG/Hr (8.16 mL/Hr) IV Continuous <Continuous>  piperacillin/tazobactam IVPB. 3.375 Gram(s) IV Intermittent every 6 hours  insulin lispro (HumaLOG) corrective regimen sliding scale   SubCutaneous Before meals and at bedtime  pantoprazole  Injectable 40 milliGRAM(s) IV Push daily  lactated ringers. 1000 milliLiter(s) (125 mL/Hr) IV Continuous <Continuous>  chlorhexidine 0.12% Liquid 15 milliLiter(s) Swish and Spit two times a day  magnesium sulfate  IVPB 2 Gram(s) IV Intermittent every 1 hour    MEDICATIONS  (PRN):      Allergies    No Known Allergies    Intolerances        SOCIAL HISTORY:  Smoke: Never Smoker  EtOH: occasional    FAMILY HISTORY:      Vital Signs Last 24 Hrs  T(C): 36.4 (23 Aug 2017 20:39), Max: 36.9 (23 Aug 2017 09:15)  T(F): 97.6 (23 Aug 2017 20:39), Max: 98.4 (23 Aug 2017 09:15)  HR: 118 (23 Aug 2017 23:33) (80 - 120)  BP: 107/84 (23 Aug 2017 23:33) (106/82 - 174/113)  BP(mean): 114 (23 Aug 2017 22:09) (108 - 134)  RR: 11 (23 Aug 2017 23:17) (10 - 18)  SpO2: 100% (23 Aug 2017 23:33) (68% - 100%)    PHYSICAL EXAM    Gen: intubated, sedated  CV: RRR  Pulm: CTABL, intubated, comfortable on vent  Abd: Soft, midline island dressing with minimal saturation, port sites CDI, staples in place  Ext: WWP    LABS:                        11.6   8.9   )-----------( 398      ( 23 Aug 2017 21:03 )             33.2     08-23    134<L>  |  106  |  2<L>  ----------------------------<  159<H>  3.9   |  19<L>  |  0.50    Ca    9.7      23 Aug 2017 21:04  Phos  4.9     08-23  Mg     1.4     08-23    TPro  4.5<L>  /  Alb  2.0<L>  /  TBili  1.4<H>  /  DBili  x   /  AST  46<H>  /  ALT  32  /  AlkPhos  43  08-23    PT/INR - ( 23 Aug 2017 21:03 )   PT: 12.5 sec;   INR: 1.12          PTT - ( 23 Aug 2017 21:03 )  PTT:21.7 sec      RADIOLOGY & ADDITIONAL STUDIES:    Assessment and Plan:  62M with hiccoughs, nausea and abdominal distension for 9days here with CT findings of intramural abscess now s/p left hemicolectomy, SBR    NEURO: propofol gtt, fentanyl gtt   CV: normotensive goals  PULM: CMV  GI/FEN: NPO, Protonix 40, LR @125/hr  : romeo to gravity  ENDO: ISS  ID: Zosyn  HEME/PPX: HSQ in AM  LINES: A line, PIV

## 2017-08-25 LAB
ANION GAP SERPL CALC-SCNC: 11 MMOL/L — SIGNIFICANT CHANGE UP (ref 5–17)
BUN SERPL-MCNC: 7 MG/DL — SIGNIFICANT CHANGE UP (ref 7–23)
CALCIUM SERPL-MCNC: 8.4 MG/DL — SIGNIFICANT CHANGE UP (ref 8.4–10.5)
CHLORIDE SERPL-SCNC: 100 MMOL/L — SIGNIFICANT CHANGE UP (ref 96–108)
CO2 SERPL-SCNC: 22 MMOL/L — SIGNIFICANT CHANGE UP (ref 22–31)
CREAT SERPL-MCNC: 0.7 MG/DL — SIGNIFICANT CHANGE UP (ref 0.5–1.3)
CULTURE RESULTS: SIGNIFICANT CHANGE UP
CULTURE RESULTS: SIGNIFICANT CHANGE UP
GLUCOSE SERPL-MCNC: 137 MG/DL — HIGH (ref 70–99)
HCT VFR BLD CALC: 29.6 % — LOW (ref 39–50)
HGB BLD-MCNC: 9.9 G/DL — LOW (ref 13–17)
MAGNESIUM SERPL-MCNC: 1.8 MG/DL — SIGNIFICANT CHANGE UP (ref 1.6–2.6)
MCHC RBC-ENTMCNC: 31 PG — SIGNIFICANT CHANGE UP (ref 27–34)
MCHC RBC-ENTMCNC: 33.4 G/DL — SIGNIFICANT CHANGE UP (ref 32–36)
MCV RBC AUTO: 92.8 FL — SIGNIFICANT CHANGE UP (ref 80–100)
PHOSPHATE SERPL-MCNC: 3.3 MG/DL — SIGNIFICANT CHANGE UP (ref 2.5–4.5)
PLATELET # BLD AUTO: 374 K/UL — SIGNIFICANT CHANGE UP (ref 150–400)
POTASSIUM SERPL-MCNC: 4.2 MMOL/L — SIGNIFICANT CHANGE UP (ref 3.5–5.3)
POTASSIUM SERPL-SCNC: 4.2 MMOL/L — SIGNIFICANT CHANGE UP (ref 3.5–5.3)
RBC # BLD: 3.19 M/UL — LOW (ref 4.2–5.8)
RBC # FLD: 15.9 % — SIGNIFICANT CHANGE UP (ref 10.3–16.9)
SODIUM SERPL-SCNC: 133 MMOL/L — LOW (ref 135–145)
SPECIMEN SOURCE: SIGNIFICANT CHANGE UP
SPECIMEN SOURCE: SIGNIFICANT CHANGE UP
SURGICAL PATHOLOGY STUDY: SIGNIFICANT CHANGE UP
WBC # BLD: 15.1 K/UL — HIGH (ref 3.8–10.5)
WBC # FLD AUTO: 15.1 K/UL — HIGH (ref 3.8–10.5)

## 2017-08-25 RX ORDER — ONDANSETRON 8 MG/1
4 TABLET, FILM COATED ORAL EVERY 6 HOURS
Qty: 0 | Refills: 0 | Status: DISCONTINUED | OUTPATIENT
Start: 2017-08-25 | End: 2017-08-29

## 2017-08-25 RX ORDER — MAGNESIUM SULFATE 500 MG/ML
1 VIAL (ML) INJECTION ONCE
Qty: 0 | Refills: 0 | Status: COMPLETED | OUTPATIENT
Start: 2017-08-25 | End: 2017-08-25

## 2017-08-25 RX ADMIN — PIPERACILLIN AND TAZOBACTAM 200 GRAM(S): 4; .5 INJECTION, POWDER, LYOPHILIZED, FOR SOLUTION INTRAVENOUS at 05:41

## 2017-08-25 RX ADMIN — HEPARIN SODIUM 5000 UNIT(S): 5000 INJECTION INTRAVENOUS; SUBCUTANEOUS at 05:41

## 2017-08-25 RX ADMIN — PANTOPRAZOLE SODIUM 40 MILLIGRAM(S): 20 TABLET, DELAYED RELEASE ORAL at 12:19

## 2017-08-25 RX ADMIN — PIPERACILLIN AND TAZOBACTAM 200 GRAM(S): 4; .5 INJECTION, POWDER, LYOPHILIZED, FOR SOLUTION INTRAVENOUS at 18:07

## 2017-08-25 RX ADMIN — PIPERACILLIN AND TAZOBACTAM 200 GRAM(S): 4; .5 INJECTION, POWDER, LYOPHILIZED, FOR SOLUTION INTRAVENOUS at 23:55

## 2017-08-25 RX ADMIN — PIPERACILLIN AND TAZOBACTAM 200 GRAM(S): 4; .5 INJECTION, POWDER, LYOPHILIZED, FOR SOLUTION INTRAVENOUS at 12:19

## 2017-08-25 RX ADMIN — ONDANSETRON 4 MILLIGRAM(S): 8 TABLET, FILM COATED ORAL at 05:41

## 2017-08-25 RX ADMIN — PIPERACILLIN AND TAZOBACTAM 200 GRAM(S): 4; .5 INJECTION, POWDER, LYOPHILIZED, FOR SOLUTION INTRAVENOUS at 00:54

## 2017-08-25 RX ADMIN — Medication 100 GRAM(S): at 07:35

## 2017-08-25 RX ADMIN — HEPARIN SODIUM 5000 UNIT(S): 5000 INJECTION INTRAVENOUS; SUBCUTANEOUS at 13:54

## 2017-08-25 RX ADMIN — HEPARIN SODIUM 5000 UNIT(S): 5000 INJECTION INTRAVENOUS; SUBCUTANEOUS at 21:51

## 2017-08-25 NOTE — PHYSICAL THERAPY INITIAL EVALUATION ADULT - GENERAL OBSERVATIONS, REHAB EVAL
Patient received seated in bedside chair with + NGT (suction, disconnected by RN prior to PT), IV, MICHAEL drain x 1.

## 2017-08-25 NOTE — PROGRESS NOTE ADULT - ASSESSMENT
62M with hiccoughs, nausea and abdominal distension for 9days here with CT findings of intramural abscess now s/p left hemicolectomy, SBR    NEURO: Dilaudid PRN  CV: normotensive goals  PULM: RA  GI/FEN: NPO,   NGT to LIWS, Protonix 40,   LR @125/hr  : Herrera to gravity  ENDO: none  ID: Zosyn (8/23--)  HEME/PPX: SCDs, HSQ   LINES: PIV, A-line (8/23-24)  Wounds: MICHAEL x1 @ splenic flexure 62M with hiccoughs, nausea and abdominal distension with CT findings of intramural abscess now s/p left hemicolectomy, SBR    NEURO: Dilaudid PRN  CV: normotensive goals  PULM: RA  GI/FEN: NPO/IVF  NGT to LIWS, Protonix 40  : d/c romeo  ENDO: none  ID: [email or web address] (8/23--)  HEME/PPX: SCDs, HSQ, OOBA  LINES: PIV, A-line (8/23-24)  Wounds: MICHAEL x1 @ splenic flexure

## 2017-08-25 NOTE — PHYSICAL THERAPY INITIAL EVALUATION ADULT - ADDITIONAL COMMENTS
Patient states that he lives in an elevator building with no steps to enter. Of note, patient had a recent hip replacement and has just transitioned from home PT to outpatient PT. Has a cane and rolling walker at home however was not using any device prior to admission,

## 2017-08-25 NOTE — PROGRESS NOTE ADULT - SUBJECTIVE AND OBJECTIVE BOX
O/N: Stepdown unit, VSS, no BF  8/24: extubated. no flatus or BM yet. Off of O2. started sqh up for transfer to sdu O/N: Stepdown unit, VSS, no BF  8/24: extubated. no flatus or BM yet. Off of O2. started sqh up for transfer to sdu    STATUS POST:  open left hemicolectomy, SBR     SUBJECTIVE: Pt c/o mild nausea, no pain. Patient seen and examined bedside by chief resident.    piperacillin/tazobactam IVPB. 3.375 Gram(s) IV Intermittent every 6 hours  heparin  Injectable 5000 Unit(s) SubCutaneous every 8 hours      Vital Signs Last 24 Hrs  T(C): 36.7 (25 Aug 2017 05:12), Max: 37.1 (24 Aug 2017 21:45)  T(F): 98.1 (25 Aug 2017 05:12), Max: 98.8 (24 Aug 2017 21:45)  HR: 102 (25 Aug 2017 04:25) (86 - 106)  BP: 143/82 (25 Aug 2017 04:25) (99/70 - 150/109)  BP(mean): 106 (25 Aug 2017 04:25) (77 - 125)  RR: 16 (25 Aug 2017 04:25) (14 - 20)  SpO2: 95% (25 Aug 2017 04:25) (94% - 98%)  I&O's Detail    24 Aug 2017 07:01  -  25 Aug 2017 07:00  --------------------------------------------------------  IN:    lactated ringers.: 2750 mL    Solution: 200 mL  Total IN: 2950 mL    OUT:    Bulb: 20 mL    Indwelling Catheter - Urethral: 1005 mL    Nasoenteral Tube: 125 mL  Total OUT: 1150 mL    Total NET: 1800 mL          General: NAD, resting comfortably in bed  C/V: NSR  Pulm: Nonlabored breathing, no respiratory distress  Abd: mildly distended, nontender, incision CDI, NGT w/ bilious output  Extrem: WWP, SCDs in place        LABS:                        9.9    15.1  )-----------( 374      ( 25 Aug 2017 05:51 )             29.6     08-25    133<L>  |  100  |  7   ----------------------------<  137<H>  4.2   |  22  |  0.70    Ca    8.4      25 Aug 2017 05:52  Phos  3.3     08-25  Mg     1.8     08-25    TPro  4.5<L>  /  Alb  1.9<L>  /  TBili  0.9  /  DBili  x   /  AST  29  /  ALT  28  /  AlkPhos  41  08-24    PT/INR - ( 23 Aug 2017 21:03 )   PT: 12.5 sec;   INR: 1.12          PTT - ( 23 Aug 2017 21:03 )  PTT:21.7 sec

## 2017-08-25 NOTE — PHYSICAL THERAPY INITIAL EVALUATION ADULT - PERTINENT HX OF CURRENT PROBLEM, REHAB EVAL
62M with hiccoughs, nausea and abdominal distension for 9days here with CT findings of intraabdominal abscess. Patient is now s/p hemicolectomy.

## 2017-08-25 NOTE — CHART NOTE - NSCHARTNOTEFT_GEN_A_CORE
Admitting Diagnosis:   Patient is a 62y old  Male who presents with a chief complaint of Surgery (19 Aug 2017 17:08)      PAST MEDICAL & SURGICAL HISTORY:  No pertinent past medical history  S/P hip replacement, right: 8/2017      Current Nutrition Order: NPO      GI Issues: Pt denies GI distress at present     Pain: pain is being managed     Skin Integrity: surgical incision     Labs:   08-25    133<L>  |  100  |  7   ----------------------------<  137<H>  4.2   |  22  |  0.70    Ca    8.4      25 Aug 2017 05:52  Phos  3.3     08-25  Mg     1.8     08-25    TPro  4.5<L>  /  Alb  1.9<L>  /  TBili  0.9  /  DBili  x   /  AST  29  /  ALT  28  /  AlkPhos  41  08-24    CAPILLARY BLOOD GLUCOSE          Medications:  MEDICATIONS  (STANDING):  piperacillin/tazobactam IVPB. 3.375 Gram(s) IV Intermittent every 6 hours  pantoprazole  Injectable 40 milliGRAM(s) IV Push daily  lactated ringers. 1000 milliLiter(s) (125 mL/Hr) IV Continuous <Continuous>  heparin  Injectable 5000 Unit(s) SubCutaneous every 8 hours    MEDICATIONS  (PRN):  HYDROmorphone  Injectable 0.5 milliGRAM(s) IV Push every 4 hours PRN Moderate Pain (4 - 6)  HYDROmorphone  Injectable 1 milliGRAM(s) IV Push every 4 hours PRN Severe Pain (7 - 10)  ondansetron Injectable 4 milliGRAM(s) IV Push every 6 hours PRN Nausea and/or Vomiting      Weight: (8/23) 81.6kg    Estimated energy needs: using admission weight 81.6kg  2040-2448kcal (25-30cal/kg)  98-114g pro (1.2-1.4g pro/kg)  2448-846ml (30-35ml/kg)    Subjective: Pt is now s/p open left hemicolectomy; SBR.  Pt with - flatus/BM.  Pt remains NPO.     Previous Nutrition Diagnosis: Inadequate protein-energy intake r/t current diet order AEB inability to meet needs on clears    Active [   ]  Resolved [x   ]    If resolved, new PES: Inadequate protein-energy intake r/t NPO status AEB meeting 0% estimated nutrient needs    Goal: 1. Initiate nutrition within 24-48 hours 2. Pt to consistently meet at least 75% estimated nutrient needs    Recommendations: 1. Initiate nutrition within 24-48 hours 2. Consider alternate mean of nutrition if pt is planned for continued NPO status     Education: Pt is understanding of current NPO status     Risk Level: High [ x  ] Moderate [   ] Low [   ]

## 2017-08-26 LAB
ANION GAP SERPL CALC-SCNC: 9 MMOL/L — SIGNIFICANT CHANGE UP (ref 5–17)
BUN SERPL-MCNC: 7 MG/DL — SIGNIFICANT CHANGE UP (ref 7–23)
CALCIUM SERPL-MCNC: 8.5 MG/DL — SIGNIFICANT CHANGE UP (ref 8.4–10.5)
CHLORIDE SERPL-SCNC: 103 MMOL/L — SIGNIFICANT CHANGE UP (ref 96–108)
CO2 SERPL-SCNC: 25 MMOL/L — SIGNIFICANT CHANGE UP (ref 22–31)
CREAT SERPL-MCNC: 0.6 MG/DL — SIGNIFICANT CHANGE UP (ref 0.5–1.3)
GLUCOSE SERPL-MCNC: 105 MG/DL — HIGH (ref 70–99)
HCT VFR BLD CALC: 25.4 % — LOW (ref 39–50)
HGB BLD-MCNC: 8.8 G/DL — LOW (ref 13–17)
MAGNESIUM SERPL-MCNC: 1.7 MG/DL — SIGNIFICANT CHANGE UP (ref 1.6–2.6)
MCHC RBC-ENTMCNC: 32.6 PG — SIGNIFICANT CHANGE UP (ref 27–34)
MCHC RBC-ENTMCNC: 34.6 G/DL — SIGNIFICANT CHANGE UP (ref 32–36)
MCV RBC AUTO: 94.1 FL — SIGNIFICANT CHANGE UP (ref 80–100)
PHOSPHATE SERPL-MCNC: 2.9 MG/DL — SIGNIFICANT CHANGE UP (ref 2.5–4.5)
PLATELET # BLD AUTO: 316 K/UL — SIGNIFICANT CHANGE UP (ref 150–400)
POTASSIUM SERPL-MCNC: 3.7 MMOL/L — SIGNIFICANT CHANGE UP (ref 3.5–5.3)
POTASSIUM SERPL-SCNC: 3.7 MMOL/L — SIGNIFICANT CHANGE UP (ref 3.5–5.3)
RBC # BLD: 2.7 M/UL — LOW (ref 4.2–5.8)
RBC # FLD: 15.5 % — SIGNIFICANT CHANGE UP (ref 10.3–16.9)
SODIUM SERPL-SCNC: 137 MMOL/L — SIGNIFICANT CHANGE UP (ref 135–145)
WBC # BLD: 8.7 K/UL — SIGNIFICANT CHANGE UP (ref 3.8–10.5)
WBC # FLD AUTO: 8.7 K/UL — SIGNIFICANT CHANGE UP (ref 3.8–10.5)

## 2017-08-26 PROCEDURE — 71010: CPT | Mod: 26

## 2017-08-26 RX ORDER — DEXTROSE MONOHYDRATE, SODIUM CHLORIDE, AND POTASSIUM CHLORIDE 50; .745; 4.5 G/1000ML; G/1000ML; G/1000ML
1000 INJECTION, SOLUTION INTRAVENOUS
Qty: 0 | Refills: 0 | Status: DISCONTINUED | OUTPATIENT
Start: 2017-08-26 | End: 2017-08-28

## 2017-08-26 RX ORDER — POTASSIUM CHLORIDE 20 MEQ
10 PACKET (EA) ORAL ONCE
Qty: 0 | Refills: 0 | Status: COMPLETED | OUTPATIENT
Start: 2017-08-26 | End: 2017-08-26

## 2017-08-26 RX ORDER — MAGNESIUM SULFATE 500 MG/ML
2 VIAL (ML) INJECTION ONCE
Qty: 0 | Refills: 0 | Status: COMPLETED | OUTPATIENT
Start: 2017-08-26 | End: 2017-08-26

## 2017-08-26 RX ADMIN — PIPERACILLIN AND TAZOBACTAM 200 GRAM(S): 4; .5 INJECTION, POWDER, LYOPHILIZED, FOR SOLUTION INTRAVENOUS at 05:46

## 2017-08-26 RX ADMIN — Medication 50 GRAM(S): at 09:26

## 2017-08-26 RX ADMIN — PIPERACILLIN AND TAZOBACTAM 200 GRAM(S): 4; .5 INJECTION, POWDER, LYOPHILIZED, FOR SOLUTION INTRAVENOUS at 18:00

## 2017-08-26 RX ADMIN — SODIUM CHLORIDE 125 MILLILITER(S): 9 INJECTION, SOLUTION INTRAVENOUS at 11:58

## 2017-08-26 RX ADMIN — HYDROMORPHONE HYDROCHLORIDE 0.5 MILLIGRAM(S): 2 INJECTION INTRAMUSCULAR; INTRAVENOUS; SUBCUTANEOUS at 05:46

## 2017-08-26 RX ADMIN — Medication 1 APPLICATION(S): at 11:55

## 2017-08-26 RX ADMIN — HYDROMORPHONE HYDROCHLORIDE 1 MILLIGRAM(S): 2 INJECTION INTRAMUSCULAR; INTRAVENOUS; SUBCUTANEOUS at 07:40

## 2017-08-26 RX ADMIN — PANTOPRAZOLE SODIUM 40 MILLIGRAM(S): 20 TABLET, DELAYED RELEASE ORAL at 11:32

## 2017-08-26 RX ADMIN — HEPARIN SODIUM 5000 UNIT(S): 5000 INJECTION INTRAVENOUS; SUBCUTANEOUS at 14:29

## 2017-08-26 RX ADMIN — PIPERACILLIN AND TAZOBACTAM 200 GRAM(S): 4; .5 INJECTION, POWDER, LYOPHILIZED, FOR SOLUTION INTRAVENOUS at 11:33

## 2017-08-26 RX ADMIN — HEPARIN SODIUM 5000 UNIT(S): 5000 INJECTION INTRAVENOUS; SUBCUTANEOUS at 05:46

## 2017-08-26 RX ADMIN — HEPARIN SODIUM 5000 UNIT(S): 5000 INJECTION INTRAVENOUS; SUBCUTANEOUS at 23:08

## 2017-08-26 RX ADMIN — HYDROMORPHONE HYDROCHLORIDE 0.5 MILLIGRAM(S): 2 INJECTION INTRAMUSCULAR; INTRAVENOUS; SUBCUTANEOUS at 21:10

## 2017-08-26 RX ADMIN — HYDROMORPHONE HYDROCHLORIDE 0.5 MILLIGRAM(S): 2 INJECTION INTRAMUSCULAR; INTRAVENOUS; SUBCUTANEOUS at 20:28

## 2017-08-26 RX ADMIN — PIPERACILLIN AND TAZOBACTAM 200 GRAM(S): 4; .5 INJECTION, POWDER, LYOPHILIZED, FOR SOLUTION INTRAVENOUS at 23:08

## 2017-08-26 RX ADMIN — Medication 100 MILLIEQUIVALENT(S): at 09:27

## 2017-08-26 RX ADMIN — HYDROMORPHONE HYDROCHLORIDE 0.5 MILLIGRAM(S): 2 INJECTION INTRAMUSCULAR; INTRAVENOUS; SUBCUTANEOUS at 06:52

## 2017-08-26 RX ADMIN — DEXTROSE MONOHYDRATE, SODIUM CHLORIDE, AND POTASSIUM CHLORIDE 125 MILLILITER(S): 50; .745; 4.5 INJECTION, SOLUTION INTRAVENOUS at 23:34

## 2017-08-26 NOTE — PROGRESS NOTE ADULT - ASSESSMENT
62M with hiccoughs, nausea and abdominal distension for 9days here with CT findings of intramural abscess now s/p left hemicolectomy, SBR    NEURO: Dilaudid PRN  CV: normotensive goals  PULM: RA  GI/FEN: CLD  NGT to LIWS, Protonix 40,   D5 1/2NS + 20 meq K+ @125/hr  : NO romeo  ENDO: none  ID: Zosyn (8/23--)  HEME/PPX: SCDs, HSQ   LINES: PIV, A-line (8/23-24)  Wounds: MICHAEL x1 @ splenic flexure

## 2017-08-26 NOTE — PROGRESS NOTE ADULT - SUBJECTIVE AND OBJECTIVE BOX
O/N: VSS, HEMANT, no BF  8/25: romeo d/c'd, passed TOV, VSS, OOBA    8/24: Robotic converted to open left hemicolectomy with hand sewn end to end anastomosis.  Small bowel resection. O/N: VSS, HEMANT, no BF  8/25: romeo d/c'd, passed TOV, VSS, OOBA    8/24: Robotic converted to open left hemicolectomy with hand sewn end to end anastomosis.  Small bowel resection.    STATUS POST:  POD2 L. Hemicolectomy and SBR     SUBJECTIVE: Patient seen and examined bedside by chief resident. Mr. Collado was resting comfortably in bed and reported good pain control. Not yet passing flatus and no BM.    piperacillin/tazobactam IVPB. 3.375 Gram(s) IV Intermittent every 6 hours  heparin  Injectable 5000 Unit(s) SubCutaneous every 8 hours      Vital Signs Last 24 Hrs  T(C): 37.2 (26 Aug 2017 05:02), Max: 37.2 (25 Aug 2017 13:31)  T(F): 98.9 (26 Aug 2017 05:02), Max: 99 (25 Aug 2017 21:41)  HR: 92 (26 Aug 2017 05:54) (90 - 94)  BP: 150/64 (26 Aug 2017 05:54) (129/79 - 160/87)  BP(mean): 117 (25 Aug 2017 17:15) (99 - 117)  RR: 16 (26 Aug 2017 05:54) (16 - 18)  SpO2: 96% (26 Aug 2017 05:54) (93% - 96%)  I&O's Detail    25 Aug 2017 07:01  -  26 Aug 2017 07:00  --------------------------------------------------------  IN:    lactated ringers.: 2875 mL    Solution: 300 mL  Total IN: 3175 mL    OUT:    Bulb: 461.5 mL    Nasoenteral Tube: 800 mL    Voided: 1050 mL  Total OUT: 2311.5 mL    Total NET: 863.5 mL      26 Aug 2017 07:01  -  26 Aug 2017 07:55  --------------------------------------------------------  IN:    lactated ringers.: 125 mL  Total IN: 125 mL    OUT:  Total OUT: 0 mL    Total NET: 125 mL          General: NAD, resting comfortably in bed  C/V: NSR  Pulm: Nonlabored breathing, no respiratory distress  Abd: soft, but distended. Nontender to palpation. Incisions are CDI  Extrem: WWP, no edema, SCDs in place        LABS:                        8.8    8.7   )-----------( 316      ( 26 Aug 2017 05:52 )             25.4     08-26    137  |  103  |  7   ----------------------------<  105<H>  3.7   |  25  |  0.60    Ca    8.5      26 Aug 2017 05:52  Phos  2.9     08-26  Mg     1.7     08-26            RADIOLOGY & ADDITIONAL STUDIES:

## 2017-08-26 NOTE — PROGRESS NOTE ADULT - ASSESSMENT
62M with hiccoughs, nausea and abdominal distension for 9days here with CT findings of intramural abscess now s/p left hemicolectomy, SBR    NEURO: Dilaudid PRN  CV: normotensive goals  PULM: RA  GI/FEN: CLD  NGT to LIWS, Protonix 40,   LR @125/hr  : NO romeo  ENDO: none  ID: Zosyn (8/23--)  HEME/PPX: SCDs, HSQ   LINES: PIV, A-line (8/23-24)  Wounds: MICHALE x1 @ splenic flexure 62M with hiccoughs, nausea and abdominal distension for 9days here with CT findings of intramural abscess now s/p left hemicolectomy, SBR    NEURO: Dilaudid PRN  CV: normotensive goals  PULM: RA  GI/FEN: CLD  NGT to LIWS, Protonix 40,   LR @125/hr  : NO romeo  ENDO: none  ID: Zosyn (8/23--)  HEME/PPX: SCDs, HSQ   LINES: PIV, A-line (8/23-24)  Wounds: MICHAEL x1 @ splenic flexure

## 2017-08-27 LAB
ANION GAP SERPL CALC-SCNC: 10 MMOL/L — SIGNIFICANT CHANGE UP (ref 5–17)
BUN SERPL-MCNC: 4 MG/DL — LOW (ref 7–23)
CALCIUM SERPL-MCNC: 8.1 MG/DL — LOW (ref 8.4–10.5)
CHLORIDE SERPL-SCNC: 102 MMOL/L — SIGNIFICANT CHANGE UP (ref 96–108)
CO2 SERPL-SCNC: 25 MMOL/L — SIGNIFICANT CHANGE UP (ref 22–31)
CREAT SERPL-MCNC: 0.6 MG/DL — SIGNIFICANT CHANGE UP (ref 0.5–1.3)
GLUCOSE SERPL-MCNC: 117 MG/DL — HIGH (ref 70–99)
HCT VFR BLD CALC: 25.9 % — LOW (ref 39–50)
HGB BLD-MCNC: 8.8 G/DL — LOW (ref 13–17)
MAGNESIUM SERPL-MCNC: 1.7 MG/DL — SIGNIFICANT CHANGE UP (ref 1.6–2.6)
MCHC RBC-ENTMCNC: 31.7 PG — SIGNIFICANT CHANGE UP (ref 27–34)
MCHC RBC-ENTMCNC: 34 G/DL — SIGNIFICANT CHANGE UP (ref 32–36)
MCV RBC AUTO: 93.2 FL — SIGNIFICANT CHANGE UP (ref 80–100)
PHOSPHATE SERPL-MCNC: 2.7 MG/DL — SIGNIFICANT CHANGE UP (ref 2.5–4.5)
PLATELET # BLD AUTO: 329 K/UL — SIGNIFICANT CHANGE UP (ref 150–400)
POTASSIUM SERPL-MCNC: 3.1 MMOL/L — LOW (ref 3.5–5.3)
POTASSIUM SERPL-SCNC: 3.1 MMOL/L — LOW (ref 3.5–5.3)
RBC # BLD: 2.78 M/UL — LOW (ref 4.2–5.8)
RBC # FLD: 14.8 % — SIGNIFICANT CHANGE UP (ref 10.3–16.9)
SODIUM SERPL-SCNC: 137 MMOL/L — SIGNIFICANT CHANGE UP (ref 135–145)
WBC # BLD: 6.3 K/UL — SIGNIFICANT CHANGE UP (ref 3.8–10.5)
WBC # FLD AUTO: 6.3 K/UL — SIGNIFICANT CHANGE UP (ref 3.8–10.5)

## 2017-08-27 PROCEDURE — 71010: CPT | Mod: 26

## 2017-08-27 RX ADMIN — PANTOPRAZOLE SODIUM 40 MILLIGRAM(S): 20 TABLET, DELAYED RELEASE ORAL at 12:00

## 2017-08-27 RX ADMIN — HEPARIN SODIUM 5000 UNIT(S): 5000 INJECTION INTRAVENOUS; SUBCUTANEOUS at 21:18

## 2017-08-27 RX ADMIN — PIPERACILLIN AND TAZOBACTAM 200 GRAM(S): 4; .5 INJECTION, POWDER, LYOPHILIZED, FOR SOLUTION INTRAVENOUS at 18:14

## 2017-08-27 RX ADMIN — HYDROMORPHONE HYDROCHLORIDE 0.5 MILLIGRAM(S): 2 INJECTION INTRAMUSCULAR; INTRAVENOUS; SUBCUTANEOUS at 20:00

## 2017-08-27 RX ADMIN — PIPERACILLIN AND TAZOBACTAM 200 GRAM(S): 4; .5 INJECTION, POWDER, LYOPHILIZED, FOR SOLUTION INTRAVENOUS at 12:00

## 2017-08-27 RX ADMIN — ONDANSETRON 4 MILLIGRAM(S): 8 TABLET, FILM COATED ORAL at 10:04

## 2017-08-27 RX ADMIN — HYDROMORPHONE HYDROCHLORIDE 0.5 MILLIGRAM(S): 2 INJECTION INTRAMUSCULAR; INTRAVENOUS; SUBCUTANEOUS at 19:17

## 2017-08-27 RX ADMIN — Medication 1 APPLICATION(S): at 12:00

## 2017-08-27 RX ADMIN — HYDROMORPHONE HYDROCHLORIDE 0.5 MILLIGRAM(S): 2 INJECTION INTRAMUSCULAR; INTRAVENOUS; SUBCUTANEOUS at 12:22

## 2017-08-27 RX ADMIN — PIPERACILLIN AND TAZOBACTAM 200 GRAM(S): 4; .5 INJECTION, POWDER, LYOPHILIZED, FOR SOLUTION INTRAVENOUS at 05:37

## 2017-08-27 RX ADMIN — HEPARIN SODIUM 5000 UNIT(S): 5000 INJECTION INTRAVENOUS; SUBCUTANEOUS at 14:53

## 2017-08-27 RX ADMIN — HEPARIN SODIUM 5000 UNIT(S): 5000 INJECTION INTRAVENOUS; SUBCUTANEOUS at 05:37

## 2017-08-27 RX ADMIN — HYDROMORPHONE HYDROCHLORIDE 0.5 MILLIGRAM(S): 2 INJECTION INTRAMUSCULAR; INTRAVENOUS; SUBCUTANEOUS at 12:45

## 2017-08-27 NOTE — PROGRESS NOTE ADULT - SUBJECTIVE AND OBJECTIVE BOX
Improving, VSS, afeb, ambulating. Passing flatus, but still remains softly distended and output from NGT is high. Cont bowel rest, NGT, IVF, supportive care

## 2017-08-27 NOTE — PROGRESS NOTE ADULT - SUBJECTIVE AND OBJECTIVE BOX
OVN: No acute events.   8/26: 50 ml / hr of NGT output. VSS, no acute events.       STATUS POST: L maurice and SBR     POST OPERATIVE DAY #: 3    SUBJECTIVE:  Flatus: [ ] YES [X ] NO             Bowel Movement: [ ] YES [X ] NO  Pain (0-10):            Pain Control Adequate: [X ] YES [ ] NO  Nausea: [ ] YES [X ] NO            Vomiting: [ ] YES [X ] NO  Diarrhea: [ ] YES [X ] NO         Constipation: [ ] YES [X ] NO     Chest Pain: [ ] YES [X ] NO    SOB:  [ ] YES [X ] NO    MEDICATIONS  (STANDING):  piperacillin/tazobactam IVPB. 3.375 Gram(s) IV Intermittent every 6 hours  pantoprazole  Injectable 40 milliGRAM(s) IV Push daily  heparin  Injectable 5000 Unit(s) SubCutaneous every 8 hours  silver sulfADIAZINE 1% Cream 1 Application(s) Topical daily  dextrose 5% + sodium chloride 0.45% with potassium chloride 20 mEq/L 1000 milliLiter(s) (125 mL/Hr) IV Continuous <Continuous>    MEDICATIONS  (PRN):  HYDROmorphone  Injectable 0.5 milliGRAM(s) IV Push every 4 hours PRN Moderate Pain (4 - 6)  HYDROmorphone  Injectable 1 milliGRAM(s) IV Push every 4 hours PRN Severe Pain (7 - 10)  ondansetron Injectable 4 milliGRAM(s) IV Push every 6 hours PRN Nausea and/or Vomiting      Vital Signs Last 24 Hrs  T(C): 36.3 (27 Aug 2017 05:06), Max: 37.1 (26 Aug 2017 15:14)  T(F): 97.3 (27 Aug 2017 05:06), Max: 98.8 (26 Aug 2017 15:14)  HR: 84 (27 Aug 2017 02:18) (80 - 96)  BP: 154/88 (27 Aug 2017 05:15) (141/86 - 154/88)  BP(mean): 112 (26 Aug 2017 19:57) (108 - 112)  RR: 15 (27 Aug 2017 05:15) (15 - 18)  SpO2: 96% (27 Aug 2017 05:15) (93% - 96%)    PHYSICAL EXAM:      Constitutional: A&Ox3      Respiratory: non labored breathing, no respiratory distress    Cardiovascular: NSR, RRR    Gastrointestinal: Softly distended, non tender, no rebound or guarding                 Incision: CDI    Genitourinary: voiding    Extremities: (-) edema                  I&O's Detail    26 Aug 2017 07:01  -  27 Aug 2017 07:00  --------------------------------------------------------  IN:    dextrose 5% + sodium chloride 0.45% with potassium chloride 20 mEq/L: 1625 mL    lactated ringers.: 450 mL    Solution: 300 mL    Solution: 100 mL    Solution: 50 mL  Total IN: 2525 mL    OUT:    Bulb: 63 mL    Nasoenteral Tube: 1100 mL    Voided: 750 mL  Total OUT: 1913 mL    Total NET: 612 mL          LABS:                        8.8    8.7   )-----------( 316      ( 26 Aug 2017 05:52 )             25.4     08-26    137  |  103  |  7   ----------------------------<  105<H>  3.7   |  25  |  0.60    Ca    8.5      26 Aug 2017 05:52  Phos  2.9     08-26  Mg     1.7     08-26            RADIOLOGY & ADDITIONAL STUDIES:

## 2017-08-28 LAB
ANION GAP SERPL CALC-SCNC: 11 MMOL/L — SIGNIFICANT CHANGE UP (ref 5–17)
BUN SERPL-MCNC: 3 MG/DL — LOW (ref 7–23)
CALCIUM SERPL-MCNC: 8.2 MG/DL — LOW (ref 8.4–10.5)
CHLORIDE SERPL-SCNC: 100 MMOL/L — SIGNIFICANT CHANGE UP (ref 96–108)
CO2 SERPL-SCNC: 28 MMOL/L — SIGNIFICANT CHANGE UP (ref 22–31)
CREAT SERPL-MCNC: 0.6 MG/DL — SIGNIFICANT CHANGE UP (ref 0.5–1.3)
GLUCOSE SERPL-MCNC: 127 MG/DL — HIGH (ref 70–99)
HCT VFR BLD CALC: 26.4 % — LOW (ref 39–50)
HGB BLD-MCNC: 8.9 G/DL — LOW (ref 13–17)
MAGNESIUM SERPL-MCNC: 1.5 MG/DL — LOW (ref 1.6–2.6)
MCHC RBC-ENTMCNC: 31.3 PG — SIGNIFICANT CHANGE UP (ref 27–34)
MCHC RBC-ENTMCNC: 33.7 G/DL — SIGNIFICANT CHANGE UP (ref 32–36)
MCV RBC AUTO: 93 FL — SIGNIFICANT CHANGE UP (ref 80–100)
PHOSPHATE SERPL-MCNC: 2.9 MG/DL — SIGNIFICANT CHANGE UP (ref 2.5–4.5)
PLATELET # BLD AUTO: 316 K/UL — SIGNIFICANT CHANGE UP (ref 150–400)
POTASSIUM SERPL-MCNC: 3.3 MMOL/L — LOW (ref 3.5–5.3)
POTASSIUM SERPL-SCNC: 3.3 MMOL/L — LOW (ref 3.5–5.3)
RBC # BLD: 2.84 M/UL — LOW (ref 4.2–5.8)
RBC # FLD: 14.3 % — SIGNIFICANT CHANGE UP (ref 10.3–16.9)
SODIUM SERPL-SCNC: 139 MMOL/L — SIGNIFICANT CHANGE UP (ref 135–145)
WBC # BLD: 5.9 K/UL — SIGNIFICANT CHANGE UP (ref 3.8–10.5)
WBC # FLD AUTO: 5.9 K/UL — SIGNIFICANT CHANGE UP (ref 3.8–10.5)

## 2017-08-28 RX ORDER — PANTOPRAZOLE SODIUM 20 MG/1
40 TABLET, DELAYED RELEASE ORAL DAILY
Qty: 0 | Refills: 0 | Status: DISCONTINUED | OUTPATIENT
Start: 2017-08-28 | End: 2017-08-29

## 2017-08-28 RX ORDER — POTASSIUM CHLORIDE 20 MEQ
10 PACKET (EA) ORAL
Qty: 0 | Refills: 0 | Status: COMPLETED | OUTPATIENT
Start: 2017-08-28 | End: 2017-08-28

## 2017-08-28 RX ORDER — MAGNESIUM SULFATE 500 MG/ML
2 VIAL (ML) INJECTION ONCE
Qty: 0 | Refills: 0 | Status: COMPLETED | OUTPATIENT
Start: 2017-08-28 | End: 2017-08-28

## 2017-08-28 RX ORDER — DEXTROSE MONOHYDRATE, SODIUM CHLORIDE, AND POTASSIUM CHLORIDE 50; .745; 4.5 G/1000ML; G/1000ML; G/1000ML
1000 INJECTION, SOLUTION INTRAVENOUS
Qty: 0 | Refills: 0 | Status: DISCONTINUED | OUTPATIENT
Start: 2017-08-28 | End: 2017-08-29

## 2017-08-28 RX ADMIN — HEPARIN SODIUM 5000 UNIT(S): 5000 INJECTION INTRAVENOUS; SUBCUTANEOUS at 05:35

## 2017-08-28 RX ADMIN — PIPERACILLIN AND TAZOBACTAM 200 GRAM(S): 4; .5 INJECTION, POWDER, LYOPHILIZED, FOR SOLUTION INTRAVENOUS at 05:35

## 2017-08-28 RX ADMIN — Medication 100 MILLIEQUIVALENT(S): at 10:55

## 2017-08-28 RX ADMIN — Medication 100 GRAM(S): at 09:10

## 2017-08-28 RX ADMIN — Medication 100 MILLIEQUIVALENT(S): at 12:23

## 2017-08-28 RX ADMIN — PIPERACILLIN AND TAZOBACTAM 200 GRAM(S): 4; .5 INJECTION, POWDER, LYOPHILIZED, FOR SOLUTION INTRAVENOUS at 00:02

## 2017-08-28 RX ADMIN — Medication 100 MILLIEQUIVALENT(S): at 17:43

## 2017-08-28 RX ADMIN — ONDANSETRON 4 MILLIGRAM(S): 8 TABLET, FILM COATED ORAL at 11:00

## 2017-08-28 RX ADMIN — PANTOPRAZOLE SODIUM 40 MILLIGRAM(S): 20 TABLET, DELAYED RELEASE ORAL at 12:22

## 2017-08-28 RX ADMIN — HYDROMORPHONE HYDROCHLORIDE 0.5 MILLIGRAM(S): 2 INJECTION INTRAMUSCULAR; INTRAVENOUS; SUBCUTANEOUS at 17:49

## 2017-08-28 RX ADMIN — PIPERACILLIN AND TAZOBACTAM 200 GRAM(S): 4; .5 INJECTION, POWDER, LYOPHILIZED, FOR SOLUTION INTRAVENOUS at 17:43

## 2017-08-28 RX ADMIN — Medication 1 APPLICATION(S): at 12:23

## 2017-08-28 RX ADMIN — PIPERACILLIN AND TAZOBACTAM 200 GRAM(S): 4; .5 INJECTION, POWDER, LYOPHILIZED, FOR SOLUTION INTRAVENOUS at 23:00

## 2017-08-28 RX ADMIN — HEPARIN SODIUM 5000 UNIT(S): 5000 INJECTION INTRAVENOUS; SUBCUTANEOUS at 21:47

## 2017-08-28 RX ADMIN — ONDANSETRON 4 MILLIGRAM(S): 8 TABLET, FILM COATED ORAL at 17:48

## 2017-08-28 RX ADMIN — PANTOPRAZOLE SODIUM 40 MILLIGRAM(S): 20 TABLET, DELAYED RELEASE ORAL at 08:05

## 2017-08-28 RX ADMIN — HYDROMORPHONE HYDROCHLORIDE 0.5 MILLIGRAM(S): 2 INJECTION INTRAMUSCULAR; INTRAVENOUS; SUBCUTANEOUS at 18:10

## 2017-08-28 RX ADMIN — DEXTROSE MONOHYDRATE, SODIUM CHLORIDE, AND POTASSIUM CHLORIDE 125 MILLILITER(S): 50; .745; 4.5 INJECTION, SOLUTION INTRAVENOUS at 05:35

## 2017-08-28 RX ADMIN — DEXTROSE MONOHYDRATE, SODIUM CHLORIDE, AND POTASSIUM CHLORIDE 125 MILLILITER(S): 50; .745; 4.5 INJECTION, SOLUTION INTRAVENOUS at 00:02

## 2017-08-28 RX ADMIN — PIPERACILLIN AND TAZOBACTAM 200 GRAM(S): 4; .5 INJECTION, POWDER, LYOPHILIZED, FOR SOLUTION INTRAVENOUS at 12:22

## 2017-08-28 RX ADMIN — HEPARIN SODIUM 5000 UNIT(S): 5000 INJECTION INTRAVENOUS; SUBCUTANEOUS at 14:40

## 2017-08-28 NOTE — PROGRESS NOTE ADULT - SUBJECTIVE AND OBJECTIVE BOX
O/N: HEMANT, VSS, noROBF, NGT 500cc  8/27: stepdown, VSS, no BF    STATUS POST:  left hemicolectomy, SBR    POD# 4 O/N: HEMANT, VSS, noROBF, NGT 500cc  8/27: stepdown, VSS, no BF    STATUS POST:  left hemicolectomy, SBR    POD# 4 s/p Left maurice and small bowel resection    SUBJECTIVE: Patient seen and examined bedside surgery team. Patient reported return of bowel function yesterday as well as last night. No other complaints. Denies nausea, vomiting, chest pain, sob, chills, fever. NG put out 250 overnight. NG pulled at bedside.    piperacillin/tazobactam IVPB. 3.375 Gram(s) IV Intermittent every 6 hours  heparin  Injectable 5000 Unit(s) SubCutaneous every 8 hours      Vital Signs Last 24 Hrs  T(C): 37.1 (28 Aug 2017 08:30), Max: 37.2 (27 Aug 2017 20:28)  T(F): 98.7 (28 Aug 2017 08:30), Max: 98.9 (27 Aug 2017 20:28)  HR: 79 (28 Aug 2017 08:30) (76 - 88)  BP: 150/91 (28 Aug 2017 08:30) (136/82 - 157/91)  BP(mean): 120 (27 Aug 2017 12:40) (120 - 120)  RR: 16 (28 Aug 2017 08:30) (16 - 17)  SpO2: 97% (28 Aug 2017 08:30) (95% - 97%)  I&O's Detail    27 Aug 2017 07:01  -  28 Aug 2017 07:00  --------------------------------------------------------  IN:    dextrose 5% + sodium chloride 0.45% with potassium chloride 20 mEq/L: 750 mL    dextrose 5% + sodium chloride 0.45% with potassium chloride 20 mEq/L: 1750 mL    Solution: 200 mL  Total IN: 2700 mL    OUT:    Bulb: 95 mL    Nasoenteral Tube: 800 mL    Voided: 1120 mL  Total OUT: 2015 mL    Total NET: 685 mL      28 Aug 2017 07:01  -  28 Aug 2017 09:25  --------------------------------------------------------  IN:  Total IN: 0 mL    OUT:    Bulb: 60 mL  Total OUT: 60 mL    Total NET: -60 mL          General: NAD, resting comfortably in bed  C/V: NSR  Pulm: Nonlabored breathing, no respiratory distress  Abd: soft, NT/ND.  Extrem: WWP, no edema, SCDs in place        LABS:                        8.9    5.9   )-----------( 316      ( 28 Aug 2017 06:54 )             26.4     08-28    139  |  100  |  3<L>  ----------------------------<  127<H>  3.3<L>   |  28  |  0.60    Ca    8.2<L>      28 Aug 2017 06:54  Phos  2.9     08-28  Mg     1.5     08-28            RADIOLOGY & ADDITIONAL STUDIES: O/N: HEMANT, VSS, noROBF, NGT 500cc  8/27: stepdown, VSS, no BF    STATUS POST:  left hemicolectomy, SBR    POD# 4 s/p Left maurice and small bowel resection    SUBJECTIVE: Patient seen and examined bedside surgery team. Patient reported return of bowel function yesterday as well as last night. No other complaints. Denies nausea, vomiting, chest pain, sob, chills, fever. NG put out 250 overnight. NG pulled at bedside.    piperacillin/tazobactam IVPB. 3.375 Gram(s) IV Intermittent every 6 hours  heparin  Injectable 5000 Unit(s) SubCutaneous every 8 hours      Vital Signs Last 24 Hrs  T(C): 37.1 (28 Aug 2017 08:30), Max: 37.2 (27 Aug 2017 20:28)  T(F): 98.7 (28 Aug 2017 08:30), Max: 98.9 (27 Aug 2017 20:28)  HR: 79 (28 Aug 2017 08:30) (76 - 88)  BP: 150/91 (28 Aug 2017 08:30) (136/82 - 157/91)  BP(mean): 120 (27 Aug 2017 12:40) (120 - 120)  RR: 16 (28 Aug 2017 08:30) (16 - 17)  SpO2: 97% (28 Aug 2017 08:30) (95% - 97%)  I&O's Detail    27 Aug 2017 07:01  -  28 Aug 2017 07:00  --------------------------------------------------------  IN:    dextrose 5% + sodium chloride 0.45% with potassium chloride 20 mEq/L: 750 mL    dextrose 5% + sodium chloride 0.45% with potassium chloride 20 mEq/L: 1750 mL    Solution: 200 mL  Total IN: 2700 mL    OUT:    Bulb: 95 mL    Nasoenteral Tube: 800 mL    Voided: 1120 mL  Total OUT: 2015 mL    Total NET: 685 mL      28 Aug 2017 07:01  -  28 Aug 2017 09:25  --------------------------------------------------------  IN:  Total IN: 0 mL    OUT:    Bulb: 60 mL  Total OUT: 60 mL    Total NET: -60 mL          General: NAD, resting comfortably in bed  C/V: NSR  Pulm: Nonlabored breathing, no respiratory distress  Abd: soft, NT/ND. ovi drain x1 at splenic flexure,  Extrem: WWP, no edema, SCDs in place        LABS:                        8.9    5.9   )-----------( 316      ( 28 Aug 2017 06:54 )             26.4     08-28    139  |  100  |  3<L>  ----------------------------<  127<H>  3.3<L>   |  28  |  0.60    Ca    8.2<L>      28 Aug 2017 06:54  Phos  2.9     08-28  Mg     1.5     08-28            RADIOLOGY & ADDITIONAL STUDIES:

## 2017-08-28 NOTE — PROGRESS NOTE ADULT - ASSESSMENT
62M s/p left hemicolectomy, SBR  NEURO: Dilaudid PRN  CV: normotensive goals  PULM: RA  GI/FEN: NPO, NGT to LIWS, Protonix 40, D5 1/2NS + 20 meq K+ @125/hr  : Voids  ENDO: none  ID: Zosyn (8/23--)  HEME/PPX: SCDs, HSQ   LINES: PIV, A-line (8/23-24)  Wounds: MICHAEL x1 @ splenic flexure 62M s/p left hemicolectomy, SBR  NEURO: Dilaudid PRN  CV: normotensive goals  PULM: RA  GI/FEN: NPO with sips (advance to clears in afternoon if tolerating)   Protonix 40, D5 1/2NS + 20 meq K+ @125/hr  : Voids  ENDO: none  ID: Zosyn (8/23--)  HEME/PPX: SCDs, HSQ   LINES: PIV, A-line (8/23-24)  Wounds: MICHAEL x1 @ splenic flexure

## 2017-08-29 ENCOUNTER — TRANSCRIPTION ENCOUNTER (OUTPATIENT)
Age: 62
End: 2017-08-29

## 2017-08-29 VITALS
DIASTOLIC BLOOD PRESSURE: 98 MMHG | TEMPERATURE: 99 F | HEART RATE: 86 BPM | SYSTOLIC BLOOD PRESSURE: 157 MMHG | OXYGEN SATURATION: 98 % | RESPIRATION RATE: 16 BRPM

## 2017-08-29 LAB
ANION GAP SERPL CALC-SCNC: 10 MMOL/L — SIGNIFICANT CHANGE UP (ref 5–17)
BUN SERPL-MCNC: 2 MG/DL — LOW (ref 7–23)
CALCIUM SERPL-MCNC: 7.9 MG/DL — LOW (ref 8.4–10.5)
CHLORIDE SERPL-SCNC: 97 MMOL/L — SIGNIFICANT CHANGE UP (ref 96–108)
CO2 SERPL-SCNC: 27 MMOL/L — SIGNIFICANT CHANGE UP (ref 22–31)
CREAT SERPL-MCNC: 0.6 MG/DL — SIGNIFICANT CHANGE UP (ref 0.5–1.3)
EXTRA LAVENDER TOP TUBE: SIGNIFICANT CHANGE UP
EXTRA LAVENDER TOP TUBE: SIGNIFICANT CHANGE UP
GLUCOSE SERPL-MCNC: 108 MG/DL — HIGH (ref 70–99)
MAGNESIUM SERPL-MCNC: 1.5 MG/DL — LOW (ref 1.6–2.6)
PHOSPHATE SERPL-MCNC: 2.5 MG/DL — SIGNIFICANT CHANGE UP (ref 2.5–4.5)
POTASSIUM SERPL-MCNC: 3.3 MMOL/L — LOW (ref 3.5–5.3)
POTASSIUM SERPL-SCNC: 3.3 MMOL/L — LOW (ref 3.5–5.3)
SODIUM SERPL-SCNC: 134 MMOL/L — LOW (ref 135–145)

## 2017-08-29 PROCEDURE — 83605 ASSAY OF LACTIC ACID: CPT

## 2017-08-29 PROCEDURE — 84132 ASSAY OF SERUM POTASSIUM: CPT

## 2017-08-29 PROCEDURE — 87040 BLOOD CULTURE FOR BACTERIA: CPT

## 2017-08-29 PROCEDURE — S2900: CPT

## 2017-08-29 PROCEDURE — 94002 VENT MGMT INPAT INIT DAY: CPT

## 2017-08-29 PROCEDURE — 80053 COMPREHEN METABOLIC PANEL: CPT

## 2017-08-29 PROCEDURE — 71045 X-RAY EXAM CHEST 1 VIEW: CPT

## 2017-08-29 PROCEDURE — 84100 ASSAY OF PHOSPHORUS: CPT

## 2017-08-29 PROCEDURE — 86901 BLOOD TYPING SEROLOGIC RH(D): CPT

## 2017-08-29 PROCEDURE — 86850 RBC ANTIBODY SCREEN: CPT

## 2017-08-29 PROCEDURE — 74177 CT ABD & PELVIS W/CONTRAST: CPT

## 2017-08-29 PROCEDURE — 88302 TISSUE EXAM BY PATHOLOGIST: CPT

## 2017-08-29 PROCEDURE — 82330 ASSAY OF CALCIUM: CPT

## 2017-08-29 PROCEDURE — 82803 BLOOD GASES ANY COMBINATION: CPT

## 2017-08-29 PROCEDURE — 36430 TRANSFUSION BLD/BLD COMPNT: CPT

## 2017-08-29 PROCEDURE — 96374 THER/PROPH/DIAG INJ IV PUSH: CPT

## 2017-08-29 PROCEDURE — 85610 PROTHROMBIN TIME: CPT

## 2017-08-29 PROCEDURE — 86923 COMPATIBILITY TEST ELECTRIC: CPT

## 2017-08-29 PROCEDURE — 93005 ELECTROCARDIOGRAM TRACING: CPT

## 2017-08-29 PROCEDURE — P9016: CPT

## 2017-08-29 PROCEDURE — 85025 COMPLETE CBC W/AUTO DIFF WBC: CPT

## 2017-08-29 PROCEDURE — 36415 COLL VENOUS BLD VENIPUNCTURE: CPT

## 2017-08-29 PROCEDURE — 80048 BASIC METABOLIC PNL TOTAL CA: CPT

## 2017-08-29 PROCEDURE — 88307 TISSUE EXAM BY PATHOLOGIST: CPT

## 2017-08-29 PROCEDURE — 99285 EMERGENCY DEPT VISIT HI MDM: CPT | Mod: 25

## 2017-08-29 PROCEDURE — 85018 HEMOGLOBIN: CPT

## 2017-08-29 PROCEDURE — 85730 THROMBOPLASTIN TIME PARTIAL: CPT

## 2017-08-29 PROCEDURE — 86900 BLOOD TYPING SEROLOGIC ABO: CPT

## 2017-08-29 PROCEDURE — 97161 PT EVAL LOW COMPLEX 20 MIN: CPT

## 2017-08-29 PROCEDURE — 85027 COMPLETE CBC AUTOMATED: CPT

## 2017-08-29 PROCEDURE — 84295 ASSAY OF SERUM SODIUM: CPT

## 2017-08-29 PROCEDURE — 83735 ASSAY OF MAGNESIUM: CPT

## 2017-08-29 RX ORDER — MAGNESIUM SULFATE 500 MG/ML
2 VIAL (ML) INJECTION EVERY 4 HOURS
Qty: 0 | Refills: 0 | Status: COMPLETED | OUTPATIENT
Start: 2017-08-29 | End: 2017-08-29

## 2017-08-29 RX ORDER — POTASSIUM CHLORIDE 20 MEQ
40 PACKET (EA) ORAL ONCE
Qty: 0 | Refills: 0 | Status: COMPLETED | OUTPATIENT
Start: 2017-08-29 | End: 2017-08-29

## 2017-08-29 RX ORDER — POTASSIUM PHOSPHATE, MONOBASIC POTASSIUM PHOSPHATE, DIBASIC 236; 224 MG/ML; MG/ML
30 INJECTION, SOLUTION INTRAVENOUS ONCE
Qty: 0 | Refills: 0 | Status: COMPLETED | OUTPATIENT
Start: 2017-08-29 | End: 2017-08-29

## 2017-08-29 RX ADMIN — PIPERACILLIN AND TAZOBACTAM 200 GRAM(S): 4; .5 INJECTION, POWDER, LYOPHILIZED, FOR SOLUTION INTRAVENOUS at 05:35

## 2017-08-29 RX ADMIN — PANTOPRAZOLE SODIUM 40 MILLIGRAM(S): 20 TABLET, DELAYED RELEASE ORAL at 11:06

## 2017-08-29 RX ADMIN — POTASSIUM PHOSPHATE, MONOBASIC POTASSIUM PHOSPHATE, DIBASIC 65 MILLIMOLE(S): 236; 224 INJECTION, SOLUTION INTRAVENOUS at 11:49

## 2017-08-29 RX ADMIN — HEPARIN SODIUM 5000 UNIT(S): 5000 INJECTION INTRAVENOUS; SUBCUTANEOUS at 13:10

## 2017-08-29 RX ADMIN — Medication 100 GRAM(S): at 09:40

## 2017-08-29 RX ADMIN — DEXTROSE MONOHYDRATE, SODIUM CHLORIDE, AND POTASSIUM CHLORIDE 125 MILLILITER(S): 50; .745; 4.5 INJECTION, SOLUTION INTRAVENOUS at 06:00

## 2017-08-29 RX ADMIN — Medication 40 MILLIEQUIVALENT(S): at 09:40

## 2017-08-29 RX ADMIN — PIPERACILLIN AND TAZOBACTAM 200 GRAM(S): 4; .5 INJECTION, POWDER, LYOPHILIZED, FOR SOLUTION INTRAVENOUS at 17:09

## 2017-08-29 RX ADMIN — PIPERACILLIN AND TAZOBACTAM 200 GRAM(S): 4; .5 INJECTION, POWDER, LYOPHILIZED, FOR SOLUTION INTRAVENOUS at 11:05

## 2017-08-29 RX ADMIN — Medication 100 GRAM(S): at 13:10

## 2017-08-29 RX ADMIN — HEPARIN SODIUM 5000 UNIT(S): 5000 INJECTION INTRAVENOUS; SUBCUTANEOUS at 05:35

## 2017-08-29 RX ADMIN — Medication 40 MILLIEQUIVALENT(S): at 11:06

## 2017-08-29 RX ADMIN — Medication 1 APPLICATION(S): at 11:11

## 2017-08-29 NOTE — DISCHARGE NOTE ADULT - MEDICATION SUMMARY - MEDICATIONS TO TAKE
I will START or STAY ON the medications listed below when I get home from the hospital:    CeleBREX 200 mg oral capsule  -- 1 cap(s) by mouth 2 times a day (after meals)  -- Indication: For home med    Percocet 5/325 oral tablet  -- 1 - 2 tab(s) by mouth every 4 - 6 hours  -- Indication: For Severe pain    aspirin 325 mg oral delayed release tablet  -- 1 tab(s) by mouth 2 times a day  -- Indication: For home med    silver sulfADIAZINE 1% topical cream  -- 1 application on skin once a day  -- Indication: For incision site care    docusate sodium 100 mg oral capsule  -- 1 cap(s) by mouth 3 times a day  -- Indication: For home med    bisacodyl 10 mg rectal suppository  -- 1 suppository(ies) rectally once a day, As needed, If no bowel movement by POD#2  -- Indication: For home med    polyethylene glycol 3350 oral powder for reconstitution  -- 17 gram(s) by mouth once a day  -- Indication: For home med    senna oral tablet  -- 2 tab(s) by mouth once a day (at bedtime)  -- Indication: For home med    amoxicillin-clavulanate 875 mg-125 mg oral tablet  -- 1 tab(s) by mouth 2 times a day  -- Finish all this medication unless otherwise directed by prescriber.  Take with food or milk.    -- Indication: For Antibiotics    pantoprazole 40 mg oral delayed release tablet  -- 1 tab(s) by mouth once a day  -- Indication: For home med

## 2017-08-29 NOTE — DISCHARGE NOTE ADULT - PLAN OF CARE
Recovery and follow up Please resume all regular home medications unless specifically advised not to take a particular medication. Also, please take any new medications as prescribed.  Please get plenty of rest, continue to ambulate several times per day, and drink adequate amounts of fluids. Avoid lifting weights greater than 5-10 lbs until you follow-up with your surgeon, who will instruct you further regarding activity restrictions.  Avoid driving or operating heavy machinery while taking pain medications.  Please follow-up with your surgeon and Primary Care Provider (PCP) as advised.  Incision Care:  *Please call your doctor or nurse practitioner if you have increased pain, swelling, redness, or drainage from the incision site.  *Avoid swimming and baths until your follow-up appointment.  *You may shower, and wash surgical incisions with a mild soap and warm water. Gently pat the area dry.    Please schedule an appointment with Dr. White for this Friday Please resume all regular home medications unless specifically advised not to take a particular medication. Also, please take any new medications as prescribed.  Please get plenty of rest, continue to ambulate several times per day, and drink adequate amounts of fluids. Avoid lifting weights greater than 5-10 lbs until you follow-up with your surgeon, who will instruct you further regarding activity restrictions.  Avoid driving or operating heavy machinery while taking pain medications.  Please follow-up with your surgeon and Primary Care Provider (PCP) as advised.  Incision Care:  *Please call your doctor or nurse practitioner if you have increased pain, swelling, redness, or drainage from the incision site.  *Avoid swimming and baths until your follow-up appointment.  *You may shower, and wash surgical incisions with a mild soap and warm water. Gently pat the area dry.  MICHAEL Drain Care:  *Please look at the site every day for signs of infection (increased redness or pain, swelling, odor, yellow or bloody discharge, warm to touch, fever).  *Maintain suction of the bulb.  *Note color, consistency, and amount of fluid in the drain. Call the doctor, nurse practitioner, or VNA nurse if the amount increases significantly or changes in character.  *Be sure to empty the drain frequently. Record the output, if instructed to do so.  *You may shower; wash the area gently with warm, soapy water.  *Keep the insertion site clean and dry otherwise.  *Avoid swimming, baths, hot tubs; do not submerge yourself in water.  *Make sure to keep the drain attached securely to your body to prevent pulling or dislocation.    Please schedule an appointment with Dr. White for this Friday for follow up appointment.

## 2017-08-29 NOTE — DISCHARGE NOTE ADULT - MEDICATION SUMMARY - MEDICATIONS TO STOP TAKING
I will STOP taking the medications listed below when I get home from the hospital:    Percocet 5/325 oral tablet  -- 1 - 2 tab(s) by mouth every 4 - 6 hours

## 2017-08-29 NOTE — PROGRESS NOTE ADULT - SUBJECTIVE AND OBJECTIVE BOX
O/N: HEMANT, VSS, tolerating diet, +BF  8/28: ROBF: BMx2 in AM, NG tube d/c'd, advanced to Sips in AM- tolerated, advanced to clears for lunch; tolerated clears but more distended in PM -- improvement after BM/flatus; MICHAEL output 140; SBPs in 140-150's --- no home BP    STATUS POST: left hemicolectomy, SBR    POD# 5 O/N: HEMANT, VSS, tolerating diet, +BF  8/28: ROBF: BMx2 in AM, NG tube d/c'd, advanced to Sips in AM- tolerated, advanced to clears for lunch; tolerated clears but more distended in PM -- improvement after BM/flatus; MICHAEL output 140; SBPs in 140-150's --- no home BP    STATUS POST: left hemicolectomy, SBR    POD# 5    SUBJECTIVE: Patient seen and examined bedside by surgery team.    piperacillin/tazobactam IVPB. 3.375 Gram(s) IV Intermittent every 6 hours  heparin  Injectable 5000 Unit(s) SubCutaneous every 8 hours      Vital Signs Last 24 Hrs  T(C): 36.4 (29 Aug 2017 05:15), Max: 37.1 (28 Aug 2017 08:30)  T(F): 97.5 (29 Aug 2017 05:15), Max: 98.8 (28 Aug 2017 16:46)  HR: 90 (29 Aug 2017 05:15) (66 - 94)  BP: 136/80 (29 Aug 2017 05:15) (112/71 - 157/87)  BP(mean): --  RR: 16 (29 Aug 2017 05:15) (16 - 20)  SpO2: 97% (29 Aug 2017 05:15) (96% - 99%)  I&O's Detail    28 Aug 2017 07:01  -  29 Aug 2017 07:00  --------------------------------------------------------  IN:    dextrose 5% + sodium chloride 0.45% with potassium chloride 20 mEq/L: 1625 mL    Oral Fluid: 360 mL    Solution: 200 mL  Total IN: 2185 mL    OUT:    Bulb: 180 mL    Voided: 800 mL  Total OUT: 980 mL    Total NET: 1205 mL          General: NAD, resting comfortably in bed  C/V: NSR  Pulm: Nonlabored breathing, no respiratory distress  Abd: softly distended, NT/ND. MICHAEL drain in place, midline incision staples in place, clean and dry  Extrem: WWP, no edema, SCDs in place        LABS:                        8.9    5.9   )-----------( 316      ( 28 Aug 2017 06:54 )             26.4     08-29    134<L>  |  97  |  2<L>  ----------------------------<  108<H>  3.3<L>   |  27  |  0.60    Ca    7.9<L>      29 Aug 2017 05:48  Phos  2.9     08-28  Mg     1.5     08-28            RADIOLOGY & ADDITIONAL STUDIES: O/N: HEMANT, VSS, tolerating diet, +BF  8/28: ROBF: BMx2 in AM, NG tube d/c'd, advanced to Sips in AM- tolerated, advanced to clears for lunch; tolerated clears but more distended in PM -- improvement after BM/flatus; MICHAEL output 140; SBPs in 140-150's --- no home BP    STATUS POST: left hemicolectomy, SBR    POD# 5    SUBJECTIVE: Patient seen and examined bedside by surgery team. No complaints. Tolerating clear diet. Improved abdominal distention from yesterday.    piperacillin/tazobactam IVPB. 3.375 Gram(s) IV Intermittent every 6 hours  heparin  Injectable 5000 Unit(s) SubCutaneous every 8 hours      Vital Signs Last 24 Hrs  T(C): 36.4 (29 Aug 2017 05:15), Max: 37.1 (28 Aug 2017 08:30)  T(F): 97.5 (29 Aug 2017 05:15), Max: 98.8 (28 Aug 2017 16:46)  HR: 90 (29 Aug 2017 05:15) (66 - 94)  BP: 136/80 (29 Aug 2017 05:15) (112/71 - 157/87)  BP(mean): --  RR: 16 (29 Aug 2017 05:15) (16 - 20)  SpO2: 97% (29 Aug 2017 05:15) (96% - 99%)  I&O's Detail    28 Aug 2017 07:01  -  29 Aug 2017 07:00  --------------------------------------------------------  IN:    dextrose 5% + sodium chloride 0.45% with potassium chloride 20 mEq/L: 1625 mL    Oral Fluid: 360 mL    Solution: 200 mL  Total IN: 2185 mL    OUT:    Bulb: 180 mL    Voided: 800 mL  Total OUT: 980 mL    Total NET: 1205 mL          General: NAD, resting comfortably in bed  C/V: NSR  Pulm: Nonlabored breathing, no respiratory distress  Abd: softly distended, NT/ND. MICHAEL drain in place, midline incision staples in place, clean and dry  Extrem: WWP, no edema, SCDs in place        LABS:                        8.9    5.9   )-----------( 316      ( 28 Aug 2017 06:54 )             26.4     08-29    134<L>  |  97  |  2<L>  ----------------------------<  108<H>  3.3<L>   |  27  |  0.60    Ca    7.9<L>      29 Aug 2017 05:48  Phos  2.9     08-28  Mg     1.5     08-28            RADIOLOGY & ADDITIONAL STUDIES:

## 2017-08-29 NOTE — DISCHARGE NOTE ADULT - CARE PLAN
Principal Discharge DX:	Abdominal abscess  Goal:	Recovery and follow up  Instructions for follow-up, activity and diet:	Please resume all regular home medications unless specifically advised not to take a particular medication. Also, please take any new medications as prescribed.  Please get plenty of rest, continue to ambulate several times per day, and drink adequate amounts of fluids. Avoid lifting weights greater than 5-10 lbs until you follow-up with your surgeon, who will instruct you further regarding activity restrictions.  Avoid driving or operating heavy machinery while taking pain medications.  Please follow-up with your surgeon and Primary Care Provider (PCP) as advised.  Incision Care:  *Please call your doctor or nurse practitioner if you have increased pain, swelling, redness, or drainage from the incision site.  *Avoid swimming and baths until your follow-up appointment.  *You may shower, and wash surgical incisions with a mild soap and warm water. Gently pat the area dry.    Please schedule an appointment with Dr. White for this Friday Principal Discharge DX:	Abdominal abscess  Goal:	Recovery and follow up  Instructions for follow-up, activity and diet:	Please resume all regular home medications unless specifically advised not to take a particular medication. Also, please take any new medications as prescribed.  Please get plenty of rest, continue to ambulate several times per day, and drink adequate amounts of fluids. Avoid lifting weights greater than 5-10 lbs until you follow-up with your surgeon, who will instruct you further regarding activity restrictions.  Avoid driving or operating heavy machinery while taking pain medications.  Please follow-up with your surgeon and Primary Care Provider (PCP) as advised.  Incision Care:  *Please call your doctor or nurse practitioner if you have increased pain, swelling, redness, or drainage from the incision site.  *Avoid swimming and baths until your follow-up appointment.  *You may shower, and wash surgical incisions with a mild soap and warm water. Gently pat the area dry.  MICHAEL Drain Care:  *Please look at the site every day for signs of infection (increased redness or pain, swelling, odor, yellow or bloody discharge, warm to touch, fever).  *Maintain suction of the bulb.  *Note color, consistency, and amount of fluid in the drain. Call the doctor, nurse practitioner, or VNA nurse if the amount increases significantly or changes in character.  *Be sure to empty the drain frequently. Record the output, if instructed to do so.  *You may shower; wash the area gently with warm, soapy water.  *Keep the insertion site clean and dry otherwise.  *Avoid swimming, baths, hot tubs; do not submerge yourself in water.  *Make sure to keep the drain attached securely to your body to prevent pulling or dislocation.    Please schedule an appointment with Dr. White for this Friday for follow up appointment.
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
## 2017-08-29 NOTE — DISCHARGE NOTE ADULT - PATIENT PORTAL LINK FT
“You can access the [facility] Patient Portal, offered by [facility], by registering with the following website: [email or web address]”

## 2017-08-29 NOTE — DISCHARGE NOTE ADULT - HOSPITAL COURSE
Mr. Collado is a 27 yo male w no significant pmh who presented with a 9 day hx of nausea, abdominal distention but no pain with CT findings of intramural abscess. Mr. Collado is a 25 yo male w no significant pmh who presented with a 9 day hx of nausea, abdominal distention but no pain with CT findings of intramural abscess.     On 8/23, patient underwent robotic converted to open left hemicolectomy and small bowel resection. Serosal injury was primarily repaired and abscess cavity was debrided. 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 Fibrinous exudate was present throughout the small bowel.    His postoperative course was unremarkable with advancement of diet, passing trial of void, and pain control. His NG tube was removed on 8/28. On day of discharge patient was stable to be d/c'd home.     He was sent home with abdominal MICHAEL drain in place and staples in place along his midline incision. He will be following up with Dr. White on 9/1 to remove staples and drain.

## 2017-08-31 ENCOUNTER — APPOINTMENT (OUTPATIENT)
Dept: ORTHOPEDIC SURGERY | Facility: CLINIC | Age: 62
End: 2017-08-31

## 2017-08-31 DIAGNOSIS — K63.0 ABSCESS OF INTESTINE: ICD-10-CM

## 2017-08-31 DIAGNOSIS — K56.5 INTESTINAL ADHESIONS [BANDS] WITH OBSTRUCTION (POSTINFECTION): ICD-10-CM

## 2017-08-31 DIAGNOSIS — Z96.641 PRESENCE OF RIGHT ARTIFICIAL HIP JOINT: ICD-10-CM

## 2017-08-31 DIAGNOSIS — K57.40 DIVERTICULITIS OF BOTH SMALL AND LARGE INTESTINE WITH PERFORATION AND ABSCESS WITHOUT BLEEDING: ICD-10-CM

## 2017-08-31 DIAGNOSIS — D64.9 ANEMIA, UNSPECIFIED: ICD-10-CM

## 2017-08-31 DIAGNOSIS — K63.2 FISTULA OF INTESTINE: ICD-10-CM

## 2017-10-05 ENCOUNTER — FORM ENCOUNTER (OUTPATIENT)
Age: 62
End: 2017-10-05

## 2017-10-06 ENCOUNTER — APPOINTMENT (OUTPATIENT)
Dept: ORTHOPEDIC SURGERY | Facility: CLINIC | Age: 62
End: 2017-10-06
Payer: OTHER GOVERNMENT

## 2017-10-06 ENCOUNTER — OUTPATIENT (OUTPATIENT)
Dept: OUTPATIENT SERVICES | Facility: HOSPITAL | Age: 62
LOS: 1 days | End: 2017-10-06
Payer: OTHER GOVERNMENT

## 2017-10-06 DIAGNOSIS — Z01.818 ENCOUNTER FOR OTHER PREPROCEDURAL EXAMINATION: ICD-10-CM

## 2017-10-06 DIAGNOSIS — M16.11 UNILATERAL PRIMARY OSTEOARTHRITIS, RIGHT HIP: ICD-10-CM

## 2017-10-06 DIAGNOSIS — G89.29 PAIN IN RIGHT HIP: ICD-10-CM

## 2017-10-06 DIAGNOSIS — Z47.1 AFTERCARE FOLLOWING JOINT REPLACEMENT SURGERY: ICD-10-CM

## 2017-10-06 DIAGNOSIS — M16.9 OSTEOARTHRITIS OF HIP, UNSPECIFIED: ICD-10-CM

## 2017-10-06 DIAGNOSIS — Z96.641 PRESENCE OF RIGHT ARTIFICIAL HIP JOINT: Chronic | ICD-10-CM

## 2017-10-06 DIAGNOSIS — Z96.641 AFTERCARE FOLLOWING JOINT REPLACEMENT SURGERY: ICD-10-CM

## 2017-10-06 DIAGNOSIS — M25.551 PAIN IN RIGHT HIP: ICD-10-CM

## 2017-10-06 PROCEDURE — 99024 POSTOP FOLLOW-UP VISIT: CPT

## 2017-10-06 PROCEDURE — 73501 X-RAY EXAM HIP UNI 1 VIEW: CPT | Mod: 26,RT

## 2017-10-06 PROCEDURE — 73501 X-RAY EXAM HIP UNI 1 VIEW: CPT

## 2017-10-06 RX ORDER — PANTOPRAZOLE 40 MG/1
40 TABLET, DELAYED RELEASE ORAL DAILY
Qty: 30 | Refills: 0 | Status: DISCONTINUED | COMMUNITY
Start: 2017-07-31 | End: 2017-10-06

## 2017-10-06 RX ORDER — OXYCODONE AND ACETAMINOPHEN 5; 325 MG/1; MG/1
5-325 TABLET ORAL
Qty: 70 | Refills: 0 | Status: DISCONTINUED | COMMUNITY
Start: 2017-07-31 | End: 2017-10-06

## 2017-10-20 PROBLEM — M16.9 HIP OSTEOARTHRITIS: Status: RESOLVED | Noted: 2017-07-27 | Resolved: 2017-10-20

## 2017-10-20 PROBLEM — M16.11 PRIMARY LOCALIZED OSTEOARTHRITIS OF RIGHT HIP: Status: RESOLVED | Noted: 2017-06-08 | Resolved: 2017-10-20

## 2017-10-20 PROBLEM — Z01.818 PRE-OP EVALUATION: Status: RESOLVED | Noted: 2017-07-27 | Resolved: 2017-10-20

## 2017-10-20 PROBLEM — M25.551 CHRONIC PAIN OF RIGHT HIP: Status: RESOLVED | Noted: 2017-07-31 | Resolved: 2017-10-20

## 2018-06-07 ENCOUNTER — APPOINTMENT (OUTPATIENT)
Dept: ORTHOPEDIC SURGERY | Facility: CLINIC | Age: 63
End: 2018-06-07

## 2021-07-09 NOTE — H&P ADULT - ASSESSMENT
Quality 226: Preventive Care And Screening: Tobacco Use: Screening And Cessation Intervention: Patient screened for tobacco use and is an ex/non-smoker
Quality 111:Pneumonia Vaccination Status For Older Adults: Pneumococcal Vaccination Previously Received
Detail Level: Detailed
Quality 431: Preventive Care And Screening: Unhealthy Alcohol Use - Screening: Patient screened for unhealthy alcohol use using a single question and scores less than 2 times per year
62M with right hip pain
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
